# Patient Record
Sex: MALE | Race: WHITE
[De-identification: names, ages, dates, MRNs, and addresses within clinical notes are randomized per-mention and may not be internally consistent; named-entity substitution may affect disease eponyms.]

---

## 2017-04-20 ENCOUNTER — HOSPITAL ENCOUNTER (OUTPATIENT)
Dept: HOSPITAL 79 - KOH-I | Age: 57
End: 2017-04-20
Payer: COMMERCIAL

## 2017-04-20 DIAGNOSIS — R06.00: Primary | ICD-10-CM

## 2017-06-22 ENCOUNTER — HOSPITAL ENCOUNTER (OUTPATIENT)
Dept: HOSPITAL 79 - HEART 5 | Age: 57
End: 2017-06-22
Attending: INTERNAL MEDICINE
Payer: COMMERCIAL

## 2017-06-22 DIAGNOSIS — R06.00: Primary | ICD-10-CM

## 2020-06-05 ENCOUNTER — TRANSCRIBE ORDERS (OUTPATIENT)
Dept: ADMINISTRATIVE | Facility: HOSPITAL | Age: 60
End: 2020-06-05

## 2020-06-05 DIAGNOSIS — R94.39 ABNORMAL STRESS TEST: Primary | ICD-10-CM

## 2020-06-16 ENCOUNTER — HOSPITAL ENCOUNTER (INPATIENT)
Facility: HOSPITAL | Age: 60
LOS: 6 days | Discharge: HOME | End: 2020-06-23
Attending: INTERNAL MEDICINE | Admitting: THORACIC SURGERY (CARDIOTHORACIC VASCULAR SURGERY)

## 2020-06-16 ENCOUNTER — APPOINTMENT (OUTPATIENT)
Dept: GENERAL RADIOLOGY | Facility: HOSPITAL | Age: 60
End: 2020-06-16

## 2020-06-16 ENCOUNTER — APPOINTMENT (OUTPATIENT)
Dept: PULMONOLOGY | Facility: HOSPITAL | Age: 60
End: 2020-06-16

## 2020-06-16 DIAGNOSIS — I34.0 SEVERE MITRAL VALVE REGURGITATION: ICD-10-CM

## 2020-06-16 DIAGNOSIS — I34.0 MITRAL VALVE INSUFFICIENCY, UNSPECIFIED ETIOLOGY: Primary | ICD-10-CM

## 2020-06-16 DIAGNOSIS — R94.39 ABNORMAL STRESS TEST: ICD-10-CM

## 2020-06-16 LAB
ABO GROUP BLD: NORMAL
ABO GROUP BLD: NORMAL
AMPHET+METHAMPHET UR QL: NEGATIVE
AMPHETAMINES UR QL: POSITIVE
ANION GAP SERPL CALCULATED.3IONS-SCNC: 11 MMOL/L (ref 5–15)
APTT PPP: 37.7 SECONDS (ref 24–37)
BARBITURATES UR QL SCN: NEGATIVE
BENZODIAZ UR QL SCN: POSITIVE
BLD GP AB SCN SERPL QL: NEGATIVE
BUN BLD-MCNC: 18 MG/DL (ref 8–23)
BUN/CREAT SERPL: 18.6 (ref 7–25)
BUPRENORPHINE SERPL-MCNC: NEGATIVE NG/ML
CALCIUM SPEC-SCNC: 9.2 MG/DL (ref 8.6–10.5)
CANNABINOIDS SERPL QL: NEGATIVE
CHLORIDE SERPL-SCNC: 103 MMOL/L (ref 98–107)
CHOLEST SERPL-MCNC: 104 MG/DL (ref 0–200)
CO2 SERPL-SCNC: 25 MMOL/L (ref 22–29)
COCAINE UR QL: NEGATIVE
CREAT BLD-MCNC: 0.97 MG/DL (ref 0.76–1.27)
DEPRECATED RDW RBC AUTO: 46 FL (ref 37–54)
ERYTHROCYTE [DISTWIDTH] IN BLOOD BY AUTOMATED COUNT: 14.9 % (ref 12.3–15.4)
GFR SERPL CREATININE-BSD FRML MDRD: 79 ML/MIN/1.73
GLUCOSE BLD-MCNC: 97 MG/DL (ref 65–99)
HBA1C MFR BLD: 4.9 % (ref 4.8–5.6)
HCT VFR BLD AUTO: 42.1 % (ref 37.5–51)
HDLC SERPL-MCNC: 44 MG/DL (ref 40–60)
HGB BLD-MCNC: 12.9 G/DL (ref 13–17.7)
INR PPP: 1.13 (ref 0.85–1.16)
LDLC SERPL CALC-MCNC: 30 MG/DL (ref 0–100)
LDLC/HDLC SERPL: 0.68 {RATIO}
MCH RBC QN AUTO: 26 PG (ref 26.6–33)
MCHC RBC AUTO-ENTMCNC: 30.6 G/DL (ref 31.5–35.7)
MCV RBC AUTO: 84.7 FL (ref 79–97)
METHADONE UR QL SCN: NEGATIVE
OPIATES UR QL: NEGATIVE
OXYCODONE UR QL SCN: NEGATIVE
PA ADP PRP-ACNC: 303 PRU
PCP UR QL SCN: NEGATIVE
PLATELET # BLD AUTO: 196 10*3/MM3 (ref 140–450)
PMV BLD AUTO: 12.4 FL (ref 6–12)
POTASSIUM BLD-SCNC: 3.7 MMOL/L (ref 3.5–5.2)
PROPOXYPH UR QL: NEGATIVE
PROTHROMBIN TIME: 14.2 SECONDS (ref 11.5–14)
RBC # BLD AUTO: 4.97 10*6/MM3 (ref 4.14–5.8)
RH BLD: POSITIVE
RH BLD: POSITIVE
SODIUM BLD-SCNC: 139 MMOL/L (ref 136–145)
T&S EXPIRATION DATE: NORMAL
TRICYCLICS UR QL SCN: NEGATIVE
TRIGL SERPL-MCNC: 150 MG/DL (ref 0–150)
VLDLC SERPL-MCNC: 30 MG/DL
WBC NRBC COR # BLD: 6.3 10*3/MM3 (ref 3.4–10.8)

## 2020-06-16 PROCEDURE — 86923 COMPATIBILITY TEST ELECTRIC: CPT

## 2020-06-16 PROCEDURE — 36415 COLL VENOUS BLD VENIPUNCTURE: CPT

## 2020-06-16 PROCEDURE — 86850 RBC ANTIBODY SCREEN: CPT | Performed by: PHYSICIAN ASSISTANT

## 2020-06-16 PROCEDURE — 86900 BLOOD TYPING SEROLOGIC ABO: CPT

## 2020-06-16 PROCEDURE — B2151ZZ FLUOROSCOPY OF LEFT HEART USING LOW OSMOLAR CONTRAST: ICD-10-PCS | Performed by: INTERNAL MEDICINE

## 2020-06-16 PROCEDURE — 80306 DRUG TEST PRSMV INSTRMNT: CPT | Performed by: PHYSICIAN ASSISTANT

## 2020-06-16 PROCEDURE — 25010000002 MIDAZOLAM PER 1 MG: Performed by: INTERNAL MEDICINE

## 2020-06-16 PROCEDURE — 93005 ELECTROCARDIOGRAM TRACING: CPT | Performed by: PHYSICIAN ASSISTANT

## 2020-06-16 PROCEDURE — 94010 BREATHING CAPACITY TEST: CPT | Performed by: INTERNAL MEDICINE

## 2020-06-16 PROCEDURE — 83036 HEMOGLOBIN GLYCOSYLATED A1C: CPT | Performed by: PHYSICIAN ASSISTANT

## 2020-06-16 PROCEDURE — 25010000002 FENTANYL CITRATE (PF) 100 MCG/2ML SOLUTION: Performed by: INTERNAL MEDICINE

## 2020-06-16 PROCEDURE — 86901 BLOOD TYPING SEROLOGIC RH(D): CPT

## 2020-06-16 PROCEDURE — 86900 BLOOD TYPING SEROLOGIC ABO: CPT | Performed by: PHYSICIAN ASSISTANT

## 2020-06-16 PROCEDURE — 82565 ASSAY OF CREATININE: CPT

## 2020-06-16 PROCEDURE — 93010 ELECTROCARDIOGRAM REPORT: CPT | Performed by: INTERNAL MEDICINE

## 2020-06-16 PROCEDURE — 99255 IP/OBS CONSLTJ NEW/EST HI 80: CPT | Performed by: THORACIC SURGERY (CARDIOTHORACIC VASCULAR SURGERY)

## 2020-06-16 PROCEDURE — 85730 THROMBOPLASTIN TIME PARTIAL: CPT | Performed by: PHYSICIAN ASSISTANT

## 2020-06-16 PROCEDURE — 80061 LIPID PANEL: CPT | Performed by: PHYSICIAN ASSISTANT

## 2020-06-16 PROCEDURE — 0 IOPAMIDOL PER 1 ML: Performed by: INTERNAL MEDICINE

## 2020-06-16 PROCEDURE — C1894 INTRO/SHEATH, NON-LASER: HCPCS | Performed by: INTERNAL MEDICINE

## 2020-06-16 PROCEDURE — 4A023N7 MEASUREMENT OF CARDIAC SAMPLING AND PRESSURE, LEFT HEART, PERCUTANEOUS APPROACH: ICD-10-PCS | Performed by: INTERNAL MEDICINE

## 2020-06-16 PROCEDURE — 71045 X-RAY EXAM CHEST 1 VIEW: CPT

## 2020-06-16 PROCEDURE — 80048 BASIC METABOLIC PNL TOTAL CA: CPT

## 2020-06-16 PROCEDURE — 85027 COMPLETE CBC AUTOMATED: CPT

## 2020-06-16 PROCEDURE — 93458 L HRT ARTERY/VENTRICLE ANGIO: CPT | Performed by: INTERNAL MEDICINE

## 2020-06-16 PROCEDURE — 86901 BLOOD TYPING SEROLOGIC RH(D): CPT | Performed by: PHYSICIAN ASSISTANT

## 2020-06-16 PROCEDURE — C1769 GUIDE WIRE: HCPCS | Performed by: INTERNAL MEDICINE

## 2020-06-16 PROCEDURE — 94010 BREATHING CAPACITY TEST: CPT

## 2020-06-16 PROCEDURE — 85576 BLOOD PLATELET AGGREGATION: CPT | Performed by: PHYSICIAN ASSISTANT

## 2020-06-16 PROCEDURE — 85610 PROTHROMBIN TIME: CPT | Performed by: PHYSICIAN ASSISTANT

## 2020-06-16 PROCEDURE — 25010000002 HEPARIN (PORCINE) PER 1000 UNITS: Performed by: INTERNAL MEDICINE

## 2020-06-16 PROCEDURE — B2111ZZ FLUOROSCOPY OF MULTIPLE CORONARY ARTERIES USING LOW OSMOLAR CONTRAST: ICD-10-PCS | Performed by: INTERNAL MEDICINE

## 2020-06-16 RX ORDER — HYDROCODONE BITARTRATE AND ACETAMINOPHEN 5; 325 MG/1; MG/1
1 TABLET ORAL EVERY 4 HOURS PRN
Status: DISCONTINUED | OUTPATIENT
Start: 2020-06-16 | End: 2020-06-18

## 2020-06-16 RX ORDER — SERTRALINE HYDROCHLORIDE 100 MG/1
100 TABLET, FILM COATED ORAL DAILY
Status: DISCONTINUED | OUTPATIENT
Start: 2020-06-16 | End: 2020-06-23 | Stop reason: HOSPADM

## 2020-06-16 RX ORDER — POTASSIUM CHLORIDE 20 MEQ/1
20 TABLET, EXTENDED RELEASE ORAL 2 TIMES DAILY
COMMUNITY

## 2020-06-16 RX ORDER — CHLORHEXIDINE GLUCONATE 500 MG/1
1 CLOTH TOPICAL EVERY 12 HOURS PRN
Status: DISCONTINUED | OUTPATIENT
Start: 2020-06-17 | End: 2020-06-18

## 2020-06-16 RX ORDER — MIDAZOLAM HYDROCHLORIDE 1 MG/ML
INJECTION INTRAMUSCULAR; INTRAVENOUS AS NEEDED
Status: DISCONTINUED | OUTPATIENT
Start: 2020-06-16 | End: 2020-06-16 | Stop reason: HOSPADM

## 2020-06-16 RX ORDER — ISOSORBIDE MONONITRATE 30 MG/1
30 TABLET, EXTENDED RELEASE ORAL DAILY
Status: DISCONTINUED | OUTPATIENT
Start: 2020-06-16 | End: 2020-06-18

## 2020-06-16 RX ORDER — METOPROLOL SUCCINATE 100 MG/1
100 TABLET, EXTENDED RELEASE ORAL DAILY
COMMUNITY

## 2020-06-16 RX ORDER — FUROSEMIDE 20 MG/1
20 TABLET ORAL DAILY
COMMUNITY

## 2020-06-16 RX ORDER — NALOXONE HCL 0.4 MG/ML
0.4 VIAL (ML) INJECTION
Status: DISCONTINUED | OUTPATIENT
Start: 2020-06-16 | End: 2020-06-18

## 2020-06-16 RX ORDER — ACETAMINOPHEN 325 MG/1
650 TABLET ORAL EVERY 4 HOURS PRN
Status: DISCONTINUED | OUTPATIENT
Start: 2020-06-16 | End: 2020-06-23 | Stop reason: HOSPADM

## 2020-06-16 RX ORDER — ATORVASTATIN CALCIUM 40 MG/1
40 TABLET, FILM COATED ORAL DAILY
Status: DISCONTINUED | OUTPATIENT
Start: 2020-06-16 | End: 2020-06-18

## 2020-06-16 RX ORDER — SERTRALINE HYDROCHLORIDE 100 MG/1
100 TABLET, FILM COATED ORAL DAILY
COMMUNITY

## 2020-06-16 RX ORDER — SODIUM CHLORIDE 0.9 % (FLUSH) 0.9 %
10 SYRINGE (ML) INJECTION EVERY 12 HOURS SCHEDULED
Status: DISCONTINUED | OUTPATIENT
Start: 2020-06-16 | End: 2020-06-23 | Stop reason: HOSPADM

## 2020-06-16 RX ORDER — FENTANYL CITRATE 50 UG/ML
INJECTION, SOLUTION INTRAMUSCULAR; INTRAVENOUS AS NEEDED
Status: DISCONTINUED | OUTPATIENT
Start: 2020-06-16 | End: 2020-06-16 | Stop reason: HOSPADM

## 2020-06-16 RX ORDER — AMLODIPINE BESYLATE 5 MG/1
5 TABLET ORAL DAILY
Status: DISCONTINUED | OUTPATIENT
Start: 2020-06-16 | End: 2020-06-19

## 2020-06-16 RX ORDER — ATORVASTATIN CALCIUM 40 MG/1
40 TABLET, FILM COATED ORAL DAILY
COMMUNITY

## 2020-06-16 RX ORDER — CHLORHEXIDINE GLUCONATE 0.12 MG/ML
15 RINSE ORAL EVERY 12 HOURS
Status: COMPLETED | OUTPATIENT
Start: 2020-06-17 | End: 2020-06-18

## 2020-06-16 RX ORDER — SODIUM CHLORIDE 0.9 % (FLUSH) 0.9 %
10 SYRINGE (ML) INJECTION AS NEEDED
Status: DISCONTINUED | OUTPATIENT
Start: 2020-06-16 | End: 2020-06-23 | Stop reason: HOSPADM

## 2020-06-16 RX ORDER — POTASSIUM CHLORIDE 750 MG/1
20 CAPSULE, EXTENDED RELEASE ORAL 2 TIMES DAILY WITH MEALS
Status: DISCONTINUED | OUTPATIENT
Start: 2020-06-16 | End: 2020-06-18

## 2020-06-16 RX ORDER — METOPROLOL SUCCINATE 100 MG/1
100 TABLET, EXTENDED RELEASE ORAL DAILY
Status: DISCONTINUED | OUTPATIENT
Start: 2020-06-17 | End: 2020-06-18

## 2020-06-16 RX ORDER — TEMAZEPAM 7.5 MG/1
7.5 CAPSULE ORAL NIGHTLY PRN
Status: DISCONTINUED | OUTPATIENT
Start: 2020-06-16 | End: 2020-06-19

## 2020-06-16 RX ORDER — LIDOCAINE HYDROCHLORIDE 10 MG/ML
INJECTION, SOLUTION EPIDURAL; INFILTRATION; INTRACAUDAL; PERINEURAL AS NEEDED
Status: DISCONTINUED | OUTPATIENT
Start: 2020-06-16 | End: 2020-06-16 | Stop reason: HOSPADM

## 2020-06-16 RX ORDER — ISOSORBIDE MONONITRATE 30 MG/1
30 TABLET, EXTENDED RELEASE ORAL DAILY
COMMUNITY

## 2020-06-16 RX ORDER — TAMSULOSIN HYDROCHLORIDE 0.4 MG/1
1 CAPSULE ORAL DAILY
COMMUNITY

## 2020-06-16 RX ORDER — FUROSEMIDE 20 MG/1
20 TABLET ORAL DAILY
Status: DISCONTINUED | OUTPATIENT
Start: 2020-06-16 | End: 2020-06-18

## 2020-06-16 RX ORDER — SODIUM CHLORIDE 9 MG/ML
250 INJECTION, SOLUTION INTRAVENOUS CONTINUOUS
Status: ACTIVE | OUTPATIENT
Start: 2020-06-16 | End: 2020-06-16

## 2020-06-16 RX ORDER — ALPRAZOLAM 0.25 MG/1
0.25 TABLET ORAL 3 TIMES DAILY PRN
Status: DISCONTINUED | OUTPATIENT
Start: 2020-06-16 | End: 2020-06-23 | Stop reason: HOSPADM

## 2020-06-16 RX ORDER — ASPIRIN 325 MG
325 TABLET, DELAYED RELEASE (ENTERIC COATED) ORAL DAILY
Status: DISCONTINUED | OUTPATIENT
Start: 2020-06-16 | End: 2020-06-23 | Stop reason: HOSPADM

## 2020-06-16 RX ORDER — TAMSULOSIN HYDROCHLORIDE 0.4 MG/1
0.4 CAPSULE ORAL DAILY
Status: DISCONTINUED | OUTPATIENT
Start: 2020-06-16 | End: 2020-06-23 | Stop reason: HOSPADM

## 2020-06-16 RX ORDER — MORPHINE SULFATE 2 MG/ML
1 INJECTION, SOLUTION INTRAMUSCULAR; INTRAVENOUS EVERY 4 HOURS PRN
Status: DISCONTINUED | OUTPATIENT
Start: 2020-06-16 | End: 2020-06-18

## 2020-06-16 RX ORDER — AMLODIPINE BESYLATE 5 MG/1
5 TABLET ORAL DAILY
COMMUNITY

## 2020-06-16 RX ORDER — IPRATROPIUM BROMIDE AND ALBUTEROL SULFATE 2.5; .5 MG/3ML; MG/3ML
3 SOLUTION RESPIRATORY (INHALATION) EVERY 4 HOURS PRN
Status: DISCONTINUED | OUTPATIENT
Start: 2020-06-17 | End: 2020-06-23 | Stop reason: HOSPADM

## 2020-06-16 RX ADMIN — SODIUM CHLORIDE, PRESERVATIVE FREE 10 ML: 5 INJECTION INTRAVENOUS at 20:47

## 2020-06-16 RX ADMIN — ASPIRIN 325 MG: 325 TABLET, COATED ORAL at 11:44

## 2020-06-16 RX ADMIN — AMLODIPINE BESYLATE 5 MG: 5 TABLET ORAL at 20:06

## 2020-06-16 RX ADMIN — ISOSORBIDE MONONITRATE 30 MG: 60 TABLET, EXTENDED RELEASE ORAL at 20:06

## 2020-06-16 RX ADMIN — TAMSULOSIN HYDROCHLORIDE 0.4 MG: 0.4 CAPSULE ORAL at 20:47

## 2020-06-16 RX ADMIN — ATORVASTATIN CALCIUM 40 MG: 40 TABLET, FILM COATED ORAL at 20:06

## 2020-06-16 RX ADMIN — SERTRALINE HYDROCHLORIDE 100 MG: 100 TABLET ORAL at 20:47

## 2020-06-16 NOTE — CONSULTS
"      CTS consult   Patient Care Team:  Provider, No Known as PCP - General  Referring MD: Tyler Goldstein MD    Chief Complaint: Severe mitral regurgitation    HPI  Patient is a 59 y.o. male with known hypertension hyperlipidemia and coronary artery disease.  Patient states his symptoms began approximately 2 years ago.  He describes having chest pressure.  Eventually found his way to cardiologist and had a work-up and was found to have significant coronary disease.  He had a stent placed in 2017.  He went through short and ultimately long-term disability.  He states initially after his stents he improved but never did fully recover.  His symptoms are so bad that he never returned to work.  He eventually retired.  He still gets continued chest pressure occasionally however his main problem is with severe shortness of breath with very minimal activity.  It is severe enough that he is unable to talk.  He states he can do very minimal activity.  He recently was seen and evaluated by Dr. Goldstein in his office.  At that appointment he was noted to have a murmur.  Reportedly he had a carotid duplex and echocardiogram performed in the office, however no data is available at this time.  The work-up led to him having a re-catheterization today.  Results showed normal coronaries with a patent stent in the LAD.  Results also showed 4+ mitral regurgitation.  Currently the patient is pain-free with no acute distress.  Shortness of breath is controlled at rest.  Plan is for the patient to be admitted and undergo full evaluation for possible mitral valve repair versus replacement.  Patient also reports that he was told from his chart review that he had COPD.  However the patient states that he was never told this.  He states he was referred to a pulmonologist at one point for shortness of breath evaluation and told his lungs were \"fine\".    Review of Systems  Pertinent items are noted in HPI.      History  Past Medical History: "   Diagnosis Date   • Anxiety    • Depression    • HLD (hyperlipidemia)    • Hypertension      Past Surgical History:   Procedure Laterality Date   • ANKLE SURGERY     • ARM AMPUTATION     • CORONARY ANGIOPLASTY WITH STENT PLACEMENT     • ROTATOR CUFF REPAIR       History reviewed. No pertinent family history.  Social History     Tobacco Use   • Smoking status: Never Smoker   • Smokeless tobacco: Current User     Types: Chew   Substance Use Topics   • Alcohol use: Never     Frequency: Never   • Drug use: Never     Medications Prior to Admission   Medication Sig Dispense Refill Last Dose   • amLODIPine (NORVASC) 5 MG tablet Take 5 mg by mouth Daily.   6/15/2020 at Unknown time   • atorvastatin (LIPITOR) 40 MG tablet Take 40 mg by mouth Daily.   6/15/2020 at Unknown time   • furosemide (LASIX) 20 MG tablet Take 20 mg by mouth Daily.   6/15/2020 at Unknown time   • isosorbide mononitrate (IMDUR) 30 MG 24 hr tablet Take 30 mg by mouth Daily.   6/15/2020 at Unknown time   • metoprolol succinate XL (TOPROL-XL) 100 MG 24 hr tablet Take 100 mg by mouth Daily.   6/16/2020 at Unknown time   • potassium chloride (K-DUR,KLOR-CON) 20 MEQ CR tablet Take 20 mEq by mouth 2 (Two) Times a Day.   6/15/2020 at Unknown time   • sertraline (ZOLOFT) 100 MG tablet Take 100 mg by mouth Daily.   6/15/2020 at Unknown time   • tamsulosin (FLOMAX) 0.4 MG capsule 24 hr capsule Take 1 capsule by mouth Daily.   6/15/2020 at Unknown time     Allergies:  Patient has no known allergies.    Objective    Vital Signs  Temp:  [97.3 °F (36.3 °C)] 97.3 °F (36.3 °C)  Heart Rate:  [53-65] 63  Resp:  [16-18] 18  BP: (101-128)/(58-89) 126/74    Physical Exam:  General Appearance: Well-developed well-nourished, cooperative and pleasant  Head: Atraumatic normocephalic  Neck: Supple, no bruit or masses noted  Lungs: Clear to auscultation bilaterally no wheezes rales or rubs  Heart: Regular rate and rhythm positive loud blowing systolic murmur heard best at the  apex radiates to the axilla  Abdomen: Soft, positive bowel sounds, nontender, no mass or bruits noted  Extremities: No clubbing cyanosis or edema  Pulses: Palpable peripheral pulses throughout the upper and lower extremities  Skin: Warm, dry no discolorations or rashes noted.  No nonhealing ulcers noted  Neurologic: Grossly intact no focal deficit.  Awake alert and oriented          Data Review:  Results from last 7 days   Lab Units 06/16/20  1136   WBC 10*3/mm3 6.30   HEMOGLOBIN g/dL 12.9*   HEMATOCRIT % 42.1   PLATELETS 10*3/mm3 196     Results from last 7 days   Lab Units 06/16/20  1136   SODIUM mmol/L 139   POTASSIUM mmol/L 3.7   CHLORIDE mmol/L 103   CO2 mmol/L 25.0   BUN mg/dL 18   CREATININE mg/dL 0.97   GLUCOSE mg/dL 97   CALCIUM mg/dL 9.2           Cardiac catheterization: Angiographic Findings:  Coronary dominance, right  · LM:   Normal  · LAD: Patent stent  · LCX: Luminal irregularities  · RCA: Luminal irregularities     LV: LVEF 50% with 4+ mitral valve regurgitation      Imaging Results (Last 72 Hours)     ** No results found for the last 72 hours. **            Assessment:    Severe mitral regurgitation  Coronary disease with a history of stent in the LAD.  Catheterization today shows normal coronaries with a patent LAD stent  Hypertension  Hyperlipidemia    Abnormal stress test    Mitral valve insufficiency        Plan:  Preoperative work-up underway.  Per the patient he has had an echocardiogram and a carotid duplex performed in Dr. Goldstein's office.  We will get the results of that if we are unable to we will may need to repeat this test.  We will check a P2 Y 12.  Patient denies any blood thinning agents.  Tentatively scheduled to undergo mitral valve repair versus replacement on 6/18/2020.  CT surgery will continue to follow making final arrangements    I agree fully with the above.  I saw the patient on June 16 and discussed surgery with him in detail.  He is aware that surgery has with the risk  of stroke bleeding infection death and renal failure.  He is well-informed about the valve choices and options and he would like to proceed with a bioprosthetic valve.  Of also discussed that shortness with Dr. Goldstein who is in agreement.  Plan for surgery on June 18.      NIKOLAY Pagan  06/16/20  17:09

## 2020-06-16 NOTE — H&P
Pre-Cardiac Catheterization Report  Cardiovascular Laboratory  [facility]      Patient:  Rakan Boone  :  1960  PCP:  Provider, No Known  PHONE:  None    DATE: 2020    BRIEF HPI:  Rakan Boone is a 59 y.o. male with hypertension, hypercholesterolemia, family history of coronary artery disease, and known coronary artery disease.  He is post previous stent from 2017.  He has been complaining of increasing chest pain that is been occurring for the past month.  He describes it as an ache that is moderate in severity lasting minutes with associated shortness of breath, dyspnea on exertion, fatigue, and palpitations.  His symptoms increased with stress and are decreased with rest.  He recently underwent an abnormal stress test and now presents for left heart catheterization with possible intervention.    Cardiac Risk Factors:  advanced age (older than 55 for men, 65 for women), dyslipidemia, family history of premature cardiovascular disease, hypertension, male gender    Anginal class in last 2 weeks:  CCS class III    CHF Class in last 2 weeks:  NYHA Class II    Cardiogenic shock:  no    Cardiac arrest <24 hours:  no    Stress test within last 6 months:   yes   Details:    Previous cardiac catheterization:  yes  Details:     Previous CABG:  no  Details:      Allergies:     IV contrast allergy:  no  No Known Allergies    MEDICATIONS:  Prior to Admission medications    Medication Sig Start Date End Date Taking? Authorizing Provider   amLODIPine (NORVASC) 5 MG tablet Take 5 mg by mouth Daily.   Yes Villa Krause MD   atorvastatin (LIPITOR) 40 MG tablet Take 40 mg by mouth Daily.   Yes Villa Krause MD   isosorbide mononitrate (IMDUR) 30 MG 24 hr tablet Take 30 mg by mouth Daily.   Yes Villa Krause MD   metoprolol succinate XL (TOPROL-XL) 100 MG 24 hr tablet Take 100 mg by mouth Daily.   Yes Villa Krause MD   sertraline (ZOLOFT) 100 MG tablet Take 100 mg by  mouth Daily.   Yes Provider, MD Villa   tamsulosin (FLOMAX) 0.4 MG capsule 24 hr capsule Take 1 capsule by mouth Daily.   Yes Provider, MD Villa       Past medical & surgical history, social and family history reviewed in the electronic medical record.    ROS:  Cardiovascular ROS: positive for - chest pain, dyspnea on exertion, palpitations and shortness of breath    Physical Exam:    Vitals: There were no vitals filed for this visit. There were no vitals filed for this visit.    General Appearance:    Alert, cooperative, in no acute distress   Head:    Normocephalic, without obvious abnormality, atraumatic   Eyes:            Lids and lashes normal, conjunctivae and sclerae normal, no   icterus, no pallor, corneas clear, PERRLA   Ears:    Ears appear intact with no abnormalities noted   Neck:   No adenopathy, supple, trachea midline, no thyromegaly, +   carotid bruits, no JVD   Back:     No kyphosis present, no scoliosis present, range of motion normal   Lungs:     Clear to auscultation,respirations regular, even and                unlabored    Heart:    Regular rhythm and normal rate, normal S1 and S2, 2/6 SE         murmur, no gallop, no rub, no click   Chest Wall:    No abnormalities observed   Abdomen:     Normal bowel sounds, no masses, no organomegaly, soft     nontender, nondistended, no guarding, no rebound                tenderness   Rectal:     Deferred   Extremities:   Moves all extremities well, no edema, no cyanosis, no           redness   Pulses:   Pulses palpable and equal bilaterally   Skin:   No bleeding, bruising or rash   Neurologic:   Cranial nerves 2 - 12 grossly intact, sensation intact     Barbaeu Test:  Left: Normal  (oxymetric Allens) Right: Not Assessed             No results found for: CHLPL, TRIG, HDL, LDLDIRECT, AST, ALT        Impression      · Abnormal stress test    Plan     · Procedure to perform: [facility]  · Planned access: Left radial artery              Miquel Lerner,  PA  06/16/20  11:21

## 2020-06-17 ENCOUNTER — ANESTHESIA EVENT (OUTPATIENT)
Dept: PERIOP | Facility: HOSPITAL | Age: 60
End: 2020-06-17

## 2020-06-17 LAB
ALBUMIN SERPL-MCNC: 3.8 G/DL (ref 3.5–5.2)
ALBUMIN/GLOB SERPL: 1.8 G/DL
ALP SERPL-CCNC: 72 U/L (ref 39–117)
ALT SERPL W P-5'-P-CCNC: 7 U/L (ref 1–41)
ANION GAP SERPL CALCULATED.3IONS-SCNC: 10 MMOL/L (ref 5–15)
AST SERPL-CCNC: 11 U/L (ref 1–40)
BILIRUB SERPL-MCNC: 1.3 MG/DL (ref 0.2–1.2)
BUN BLD-MCNC: 21 MG/DL (ref 8–23)
BUN/CREAT SERPL: 25.3 (ref 7–25)
CALCIUM SPEC-SCNC: 8.6 MG/DL (ref 8.6–10.5)
CHLORIDE SERPL-SCNC: 105 MMOL/L (ref 98–107)
CO2 SERPL-SCNC: 24 MMOL/L (ref 22–29)
CREAT BLD-MCNC: 0.83 MG/DL (ref 0.76–1.27)
DEPRECATED RDW RBC AUTO: 45.9 FL (ref 37–54)
ERYTHROCYTE [DISTWIDTH] IN BLOOD BY AUTOMATED COUNT: 14.8 % (ref 12.3–15.4)
GFR SERPL CREATININE-BSD FRML MDRD: 95 ML/MIN/1.73
GLOBULIN UR ELPH-MCNC: 2.1 GM/DL
GLUCOSE BLD-MCNC: 101 MG/DL (ref 65–99)
GLUCOSE BLDC GLUCOMTR-MCNC: 105 MG/DL (ref 70–130)
GLUCOSE BLDC GLUCOMTR-MCNC: 107 MG/DL (ref 70–130)
GLUCOSE BLDC GLUCOMTR-MCNC: 127 MG/DL (ref 70–130)
HCT VFR BLD AUTO: 37.7 % (ref 37.5–51)
HGB BLD-MCNC: 11.3 G/DL (ref 13–17.7)
MCH RBC QN AUTO: 25.7 PG (ref 26.6–33)
MCHC RBC AUTO-ENTMCNC: 30 G/DL (ref 31.5–35.7)
MCV RBC AUTO: 85.7 FL (ref 79–97)
PA ADP PRP-ACNC: 335 PRU
PLATELET # BLD AUTO: 157 10*3/MM3 (ref 140–450)
PMV BLD AUTO: 12.3 FL (ref 6–12)
POTASSIUM BLD-SCNC: 3.9 MMOL/L (ref 3.5–5.2)
PROT SERPL-MCNC: 5.9 G/DL (ref 6–8.5)
RBC # BLD AUTO: 4.4 10*6/MM3 (ref 4.14–5.8)
SODIUM BLD-SCNC: 139 MMOL/L (ref 136–145)
WBC NRBC COR # BLD: 5.36 10*3/MM3 (ref 3.4–10.8)

## 2020-06-17 PROCEDURE — 85027 COMPLETE CBC AUTOMATED: CPT | Performed by: PHYSICIAN ASSISTANT

## 2020-06-17 PROCEDURE — 85576 BLOOD PLATELET AGGREGATION: CPT | Performed by: PHYSICIAN ASSISTANT

## 2020-06-17 PROCEDURE — 82962 GLUCOSE BLOOD TEST: CPT

## 2020-06-17 PROCEDURE — 80053 COMPREHEN METABOLIC PANEL: CPT | Performed by: PHYSICIAN ASSISTANT

## 2020-06-17 PROCEDURE — 87635 SARS-COV-2 COVID-19 AMP PRB: CPT | Performed by: PHYSICIAN ASSISTANT

## 2020-06-17 PROCEDURE — 99024 POSTOP FOLLOW-UP VISIT: CPT | Performed by: THORACIC SURGERY (CARDIOTHORACIC VASCULAR SURGERY)

## 2020-06-17 RX ORDER — SODIUM CHLORIDE 0.9 % (FLUSH) 0.9 %
10 SYRINGE (ML) INJECTION AS NEEDED
Status: CANCELLED | OUTPATIENT
Start: 2020-06-17

## 2020-06-17 RX ORDER — SODIUM CHLORIDE 0.9 % (FLUSH) 0.9 %
10 SYRINGE (ML) INJECTION EVERY 12 HOURS SCHEDULED
Status: CANCELLED | OUTPATIENT
Start: 2020-06-17

## 2020-06-17 RX ORDER — FAMOTIDINE 10 MG/ML
20 INJECTION, SOLUTION INTRAVENOUS ONCE
Status: CANCELLED | OUTPATIENT
Start: 2020-06-17 | End: 2020-06-17

## 2020-06-17 RX ADMIN — TAMSULOSIN HYDROCHLORIDE 0.4 MG: 0.4 CAPSULE ORAL at 08:55

## 2020-06-17 RX ADMIN — ATORVASTATIN CALCIUM 40 MG: 40 TABLET, FILM COATED ORAL at 08:55

## 2020-06-17 RX ADMIN — SODIUM CHLORIDE, PRESERVATIVE FREE 10 ML: 5 INJECTION INTRAVENOUS at 08:53

## 2020-06-17 RX ADMIN — POTASSIUM CHLORIDE 20 MEQ: 10 CAPSULE, COATED, EXTENDED RELEASE ORAL at 19:04

## 2020-06-17 RX ADMIN — POTASSIUM CHLORIDE 20 MEQ: 10 CAPSULE, COATED, EXTENDED RELEASE ORAL at 08:54

## 2020-06-17 RX ADMIN — METOPROLOL SUCCINATE 100 MG: 100 TABLET, EXTENDED RELEASE ORAL at 08:54

## 2020-06-17 RX ADMIN — MUPIROCIN 1 APPLICATION: 20 OINTMENT TOPICAL at 19:04

## 2020-06-17 RX ADMIN — SERTRALINE HYDROCHLORIDE 100 MG: 100 TABLET ORAL at 08:54

## 2020-06-17 RX ADMIN — FUROSEMIDE 20 MG: 20 TABLET ORAL at 08:54

## 2020-06-17 RX ADMIN — ISOSORBIDE MONONITRATE 30 MG: 60 TABLET, EXTENDED RELEASE ORAL at 08:54

## 2020-06-17 RX ADMIN — AMLODIPINE BESYLATE 5 MG: 5 TABLET ORAL at 08:55

## 2020-06-17 RX ADMIN — CHLORHEXIDINE GLUCONATE 0.12% ORAL RINSE 15 ML: 1.2 LIQUID ORAL at 19:04

## 2020-06-17 NOTE — PLAN OF CARE
Problem: Patient Care Overview  Goal: Plan of Care Review  Outcome: Ongoing (interventions implemented as appropriate)  Flowsheets (Taken 6/17/2020 0440)  Progress: no change  Outcome Summary: Patient up from CVOU. VSS. Normal sinus to sinus bradycardia on monitor. Room air. Left radial site remains clean, dry, intact, and soft. No complaints of pain noted. Will continue to monitor.

## 2020-06-17 NOTE — PROGRESS NOTES
[facility]       LOS: 0 days   Patient Care Team:  Provider, No Known as PCP - General        Subjective       Patient Denies:  Cp, sob, palpitations.      Vital Signs  Temp:  [97.3 °F (36.3 °C)-98.6 °F (37 °C)] 98.5 °F (36.9 °C)  Heart Rate:  [53-68] 60  Resp:  [16-18] 18  BP: (101-128)/(58-90) 112/72  No intake or output data in the 24 hours ending 06/17/20 0923  No intake/output data recorded.    Physical Exam:     General Appearance:    Alert, cooperative, in no acute distress       Neck:   No adenopathy, supple, trachea midline, no thyromegaly, no JVD   Lungs:     Clear to auscultation,respirations regular, even and                unlabored    Heart:    Regular rhythm and normal rate, normal S1 and S2, 2/6 HS         murmur, no gallop, no rub, no click   Chest Wall:    No abnormalities observed   Abdomen:     Normal bowel sounds, no masses, no organomegaly, soft     nontender, nondistended   Extremities:   Moves all extremities well, no edema, no cyanosis, no           redness   Pulses:   Pulses palpable and equal bilaterally     Results Review:     I reviewed the patient's new clinical results.      WBC WBC   Date/Time Value Ref Range Status   06/17/2020 0458 5.36 3.40 - 10.80 10*3/mm3 Final   06/16/2020 1136 6.30 3.40 - 10.80 10*3/mm3 Final            HGB Hemoglobin   Date/Time Value Ref Range Status   06/17/2020 0458 11.3 (L) 13.0 - 17.7 g/dL Final   06/16/2020 1136 12.9 (L) 13.0 - 17.7 g/dL Final           HCT Hematocrit   Date/Time Value Ref Range Status   06/17/2020 0458 37.7 37.5 - 51.0 % Final   06/16/2020 1136 42.1 37.5 - 51.0 % Final            Platlets Platelets   Date/Time Value Ref Range Status   06/17/2020 0458 157 140 - 450 10*3/mm3 Final   06/16/2020 1136 196 140 - 450 10*3/mm3 Final     Sodium  Sodium   Date/Time Value Ref Range Status   06/17/2020 0458 139 136 - 145 mmol/L Final   06/16/2020 1136 139 136 - 145 mmol/L Final      Potassium  Potassium   Date/Time Value Ref Range Status   06/17/2020 0458 3.9 3.5 - 5.2 mmol/L Final   06/16/2020 1136 3.7 3.5 - 5.2 mmol/L Final     Chloride  Chloride   Date/Time Value Ref Range Status   06/17/2020 0458 105 98 - 107 mmol/L Final   06/16/2020 1136 103 98 - 107 mmol/L Final     BicarbonateNo results found for: PLASMABICARB    BUN BUN   Date/Time Value Ref Range Status   06/17/2020 0458 21 8 - 23 mg/dL Final   06/16/2020 1136 18 8 - 23 mg/dL Final      Creatinine Creatinine   Date/Time Value Ref Range Status   06/17/2020 0458 0.83 0.76 - 1.27 mg/dL Final   06/16/2020 1136 0.97 0.76 - 1.27 mg/dL Final      Calcium Calcium   Date/Time Value Ref Range Status   06/17/2020 0458 8.6 8.6 - 10.5 mg/dL Final   06/16/2020 1136 9.2 8.6 - 10.5 mg/dL Final      Mag @RESULFAST(MG:3)@        PT/INR:       Lab Results   Component Value Date    PROTIME 14.2 (H) 06/16/2020    INR 1.13 06/16/2020      Troponin I:  No results found for: TROPONINI No results found for: CKTOTAL, CKMB, CKMBINDEX, POCRTROPI, TROPONINT      amLODIPine 5 mg Oral Daily   [MAR Hold] aspirin 325 mg Oral Daily   atorvastatin 40 mg Oral Daily   chlorhexidine 15 mL Mouth/Throat Q12H   furosemide 20 mg Oral Daily   isosorbide mononitrate 30 mg Oral Daily   metoprolol succinate  mg Oral Daily   mupirocin 1 application Each Nare Q12H   potassium chloride 20 mEq Oral BID With Meals   sertraline 100 mg Oral Daily   sodium chloride 10 mL Intravenous Q12H   tamsulosin 0.4 mg Oral Daily          Assessment/Plan     Patient Active Problem List   Diagnosis Code   • Abnormal stress test R94.39   • Mitral valve insufficiency I34.0     Patent LAD stent  Normal EF  MVR tomorrow    MD Miquel Cadet PA  06/17/20  09:23

## 2020-06-17 NOTE — PROGRESS NOTES
Discharge Planning Assessment  [facility]     Patient Name: Rakan Boone  MRN: [id number]  Today's Date: 6/17/2020    Admit Date: 6/16/2020    Discharge Needs Assessment     Row Name 06/17/20 1454       Living Environment    Lives With  spouse;other relative(s)    Current Living Arrangements  home/apartment/condo    Primary Care Provided by  self    Provides Primary Care For  no one    Family Caregiver if Needed  spouse    Able to Return to Prior Arrangements  yes       Transition Planning    Patient/Family Anticipates Transition to  home    Transportation Anticipated  family or friend will provide       Discharge Needs Assessment    Equipment Currently Used at Home  none        Discharge Plan     Row Name 06/17/20 1456       Plan    Plan  Home    Patient/Family in Agreement with Plan  yes    Plan Comments  Spoke w/ patient and daughter at bedside. Patient lives with his wife and mother in law in a two story in [facility]. He has a bed and bath on the first floor. PTA he was independent w/ ADLS and mobility. He denies any needs at this time. Plan is home. CM following.     Final Discharge Disposition Code  01 - home or self-care        Destination      Coordination has not been started for this encounter.      Durable Medical Equipment      Coordination has not been started for this encounter.      Dialysis/Infusion      Coordination has not been started for this encounter.      Home Medical Care      Coordination has not been started for this encounter.      Therapy      Coordination has not been started for this encounter.      Community Resources      Coordination has not been started for this encounter.        Expected Discharge Date and Time     Expected Discharge Date Expected Discharge Time    Jun 16, 2020         Demographic Summary     Row Name 06/17/20 1454       General Information    Arrived From  home    Reason for Consult  discharge planning        Functional Status     Row Name 06/17/20 1454        Functional Status    Usual Activity Tolerance  moderate    Current Activity Tolerance  moderate        Psychosocial    No documentation.       Abuse/Neglect    No documentation.       Legal    No documentation.       Substance Abuse    No documentation.       Patient Forms    No documentation.           Yen Pal RN

## 2020-06-17 NOTE — PROGRESS NOTES
CTS Progress Note      POD  s/p        LOS: 0 days   Patient Care Team:  Provider, No Known as PCP - General    Subjective  Appetite is fair  Breathing better   slept fairly well last night    CC:     Objective    Vital Signs  Temp:  [97.3 °F (36.3 °C)-98.6 °F (37 °C)] 98.5 °F (36.9 °C)  Heart Rate:  [53-68] 60  Resp:  [16-18] 18  BP: (101-128)/(58-90) 112/72    Physical Exam:   General Appearance:    Lungs: clear to auscultation, respirations regular, respirations even and respirations unlabored   Heart: Regular rate and rhythm, loud holosystolic murmur   Skin: Warm, dry   Abdomen: Soft, nontender, bowel sounds present throughout.  Results   Results from last 7 days   Lab Units 06/17/20  0458   WBC 10*3/mm3 5.36   HEMOGLOBIN g/dL 11.3*   HEMATOCRIT % 37.7   PLATELETS 10*3/mm3 157     Results from last 7 days   Lab Units 06/17/20  0458   SODIUM mmol/L 139   POTASSIUM mmol/L 3.9   CHLORIDE mmol/L 105   CO2 mmol/L 24.0   BUN mg/dL 21   CREATININE mg/dL 0.83   GLUCOSE mg/dL 101*   CALCIUM mg/dL 8.6           Imaging Results (Last 24 Hours)     Procedure Component Value Units Date/Time    XR Chest 1 View [[id number]] Resulted:  06/16/20 1806     Updated:  06/16/20 1820          Assessment      Abnormal stress test    Mitral valve insufficiency  Seeing patient for severe mitral regurgitation  Hemodynamically stable      Plan   For mitral valve replacement tomorrow, Dr. Burden.  Agree fully with the above.  Patient is agreeable to proceed with a bioprosthetic valve and the risks have once again been explained and this is been discussed with NIKOLAY Luong  06/17/20  10:04

## 2020-06-18 ENCOUNTER — APPOINTMENT (OUTPATIENT)
Dept: GENERAL RADIOLOGY | Facility: HOSPITAL | Age: 60
End: 2020-06-18

## 2020-06-18 ENCOUNTER — ANESTHESIA (OUTPATIENT)
Dept: PERIOP | Facility: HOSPITAL | Age: 60
End: 2020-06-18

## 2020-06-18 ENCOUNTER — ANCILLARY PROCEDURE (OUTPATIENT)
Dept: PERIOP | Facility: HOSPITAL | Age: 60
End: 2020-06-18

## 2020-06-18 PROBLEM — E78.5 DYSLIPIDEMIA: Status: ACTIVE | Noted: 2020-06-18

## 2020-06-18 PROBLEM — Z72.0 CHEWING TOBACCO USE: Status: ACTIVE | Noted: 2020-06-18

## 2020-06-18 PROBLEM — I10 HYPERTENSION: Status: ACTIVE | Noted: 2020-06-18

## 2020-06-18 PROBLEM — F41.9 ANXIETY AND DEPRESSION: Status: ACTIVE | Noted: 2020-06-18

## 2020-06-18 PROBLEM — I34.0 SEVERE MITRAL VALVE REGURGITATION: Status: ACTIVE | Noted: 2020-06-18

## 2020-06-18 PROBLEM — N40.0 BPH (BENIGN PROSTATIC HYPERPLASIA): Status: ACTIVE | Noted: 2020-06-18

## 2020-06-18 PROBLEM — F32.A ANXIETY AND DEPRESSION: Status: ACTIVE | Noted: 2020-06-18

## 2020-06-18 LAB
ACT BLD: 120 SECONDS (ref 82–152)
ACT BLD: 125 SECONDS (ref 82–152)
ACT BLD: 131 SECONDS (ref 82–152)
ACT BLD: 632 SECONDS (ref 82–152)
ACT BLD: 874 SECONDS (ref 82–152)
ACT BLD: 951 SECONDS (ref 82–152)
ALBUMIN SERPL-MCNC: 3.7 G/DL (ref 3.5–5.2)
ALBUMIN SERPL-MCNC: 4.4 G/DL (ref 3.5–5.2)
ANION GAP SERPL CALCULATED.3IONS-SCNC: 11 MMOL/L (ref 5–15)
ANION GAP SERPL CALCULATED.3IONS-SCNC: 14 MMOL/L (ref 5–15)
APTT PPP: 36.8 SECONDS (ref 24–37)
ARTERIAL PATENCY WRIST A: ABNORMAL
ATMOSPHERIC PRESS: ABNORMAL MM[HG]
BASE EXCESS BLDA CALC-SCNC: -1 MMOL/L (ref -5–5)
BASE EXCESS BLDA CALC-SCNC: 0 MMOL/L (ref -5–5)
BASE EXCESS BLDA CALC-SCNC: 0.3 MMOL/L (ref 0–2)
BASE EXCESS BLDA CALC-SCNC: 1.4 MMOL/L (ref 0–2)
BASE EXCESS BLDA CALC-SCNC: 2 MMOL/L (ref -5–5)
BASE EXCESS BLDA CALC-SCNC: 2.8 MMOL/L (ref 0–2)
BASE EXCESS BLDA CALC-SCNC: 3 MMOL/L (ref -5–5)
BASE EXCESS BLDA CALC-SCNC: 4 MMOL/L (ref -5–5)
BASE EXCESS BLDA CALC-SCNC: 6 MMOL/L (ref -5–5)
BDY SITE: ABNORMAL
BODY TEMPERATURE: 37 C
BUN BLD-MCNC: 14 MG/DL (ref 8–23)
BUN BLD-MCNC: 16 MG/DL (ref 8–23)
BUN/CREAT SERPL: 13.6 (ref 7–25)
BUN/CREAT SERPL: 14.7 (ref 7–25)
CA-I BLDA-SCNC: 0.92 MMOL/L (ref 1.2–1.32)
CA-I BLDA-SCNC: 0.96 MMOL/L (ref 1.2–1.32)
CA-I BLDA-SCNC: 1.02 MMOL/L (ref 1.2–1.32)
CA-I BLDA-SCNC: 1.14 MMOL/L (ref 1.2–1.32)
CA-I BLDA-SCNC: 1.16 MMOL/L (ref 1.2–1.32)
CA-I BLDA-SCNC: 1.27 MMOL/L (ref 1.2–1.32)
CA-I SERPL ISE-MCNC: 1.38 MMOL/L (ref 1.12–1.32)
CALCIUM SPEC-SCNC: 9.3 MG/DL (ref 8.6–10.5)
CALCIUM SPEC-SCNC: 9.7 MG/DL (ref 8.6–10.5)
CHLORIDE SERPL-SCNC: 101 MMOL/L (ref 98–107)
CHLORIDE SERPL-SCNC: 102 MMOL/L (ref 98–107)
CO2 BLDA-SCNC: 24 MMOL/L (ref 24–29)
CO2 BLDA-SCNC: 25 MMOL/L (ref 24–29)
CO2 BLDA-SCNC: 27 MMOL/L (ref 22–33)
CO2 BLDA-SCNC: 27 MMOL/L (ref 24–29)
CO2 BLDA-SCNC: 28 MMOL/L (ref 24–29)
CO2 BLDA-SCNC: 28.4 MMOL/L (ref 22–33)
CO2 BLDA-SCNC: 28.7 MMOL/L (ref 22–33)
CO2 BLDA-SCNC: 29 MMOL/L (ref 24–29)
CO2 BLDA-SCNC: 31 MMOL/L (ref 24–29)
CO2 SERPL-SCNC: 25 MMOL/L (ref 22–29)
CO2 SERPL-SCNC: 25 MMOL/L (ref 22–29)
COHGB MFR BLD: 1.3 % (ref 0–2)
COHGB MFR BLD: 1.5 % (ref 0–2)
COHGB MFR BLD: 1.6 % (ref 0–2)
CREAT BLD-MCNC: 0.95 MG/DL (ref 0.76–1.27)
CREAT BLD-MCNC: 1.18 MG/DL (ref 0.76–1.27)
DEPRECATED RDW RBC AUTO: 44.1 FL (ref 37–54)
DEPRECATED RDW RBC AUTO: 45.1 FL (ref 37–54)
ERYTHROCYTE [DISTWIDTH] IN BLOOD BY AUTOMATED COUNT: 14.6 % (ref 12.3–15.4)
ERYTHROCYTE [DISTWIDTH] IN BLOOD BY AUTOMATED COUNT: 14.6 % (ref 12.3–15.4)
GFR SERPL CREATININE-BSD FRML MDRD: 63 ML/MIN/1.73
GFR SERPL CREATININE-BSD FRML MDRD: 81 ML/MIN/1.73
GLUCOSE BLD-MCNC: 123 MG/DL (ref 65–99)
GLUCOSE BLD-MCNC: 141 MG/DL (ref 65–99)
GLUCOSE BLDC GLUCOMTR-MCNC: 101 MG/DL (ref 70–130)
GLUCOSE BLDC GLUCOMTR-MCNC: 126 MG/DL (ref 70–130)
GLUCOSE BLDC GLUCOMTR-MCNC: 127 MG/DL (ref 70–130)
GLUCOSE BLDC GLUCOMTR-MCNC: 128 MG/DL (ref 70–130)
GLUCOSE BLDC GLUCOMTR-MCNC: 132 MG/DL (ref 70–130)
GLUCOSE BLDC GLUCOMTR-MCNC: 133 MG/DL (ref 70–130)
GLUCOSE BLDC GLUCOMTR-MCNC: 133 MG/DL (ref 70–130)
GLUCOSE BLDC GLUCOMTR-MCNC: 135 MG/DL (ref 70–130)
GLUCOSE BLDC GLUCOMTR-MCNC: 138 MG/DL (ref 70–130)
GLUCOSE BLDC GLUCOMTR-MCNC: 138 MG/DL (ref 70–130)
GLUCOSE BLDC GLUCOMTR-MCNC: 139 MG/DL (ref 70–130)
GLUCOSE BLDC GLUCOMTR-MCNC: 140 MG/DL (ref 70–130)
GLUCOSE BLDC GLUCOMTR-MCNC: 145 MG/DL (ref 70–130)
GLUCOSE BLDC GLUCOMTR-MCNC: 150 MG/DL (ref 70–130)
GLUCOSE BLDC GLUCOMTR-MCNC: 161 MG/DL (ref 70–130)
HCO3 BLDA-SCNC: 23.1 MMOL/L (ref 22–26)
HCO3 BLDA-SCNC: 24.3 MMOL/L (ref 22–26)
HCO3 BLDA-SCNC: 26 MMOL/L (ref 20–26)
HCO3 BLDA-SCNC: 26.1 MMOL/L (ref 22–26)
HCO3 BLDA-SCNC: 26.7 MMOL/L (ref 22–26)
HCO3 BLDA-SCNC: 27 MMOL/L (ref 20–26)
HCO3 BLDA-SCNC: 27.1 MMOL/L (ref 20–26)
HCO3 BLDA-SCNC: 27.6 MMOL/L (ref 22–26)
HCO3 BLDA-SCNC: 29.8 MMOL/L (ref 22–26)
HCT VFR BLD AUTO: 30.4 % (ref 37.5–51)
HCT VFR BLD AUTO: 31.4 % (ref 37.5–51)
HCT VFR BLD CALC: 29.6 %
HCT VFR BLD CALC: 31 %
HCT VFR BLD CALC: 31.5 %
HCT VFR BLDA CALC: 24 % (ref 38–51)
HCT VFR BLDA CALC: 26 % (ref 38–51)
HCT VFR BLDA CALC: 28 % (ref 38–51)
HCT VFR BLDA CALC: 30 % (ref 38–51)
HCT VFR BLDA CALC: 31 % (ref 38–51)
HCT VFR BLDA CALC: 35 % (ref 38–51)
HGB BLD-MCNC: 9.4 G/DL (ref 13–17.7)
HGB BLD-MCNC: 9.8 G/DL (ref 13–17.7)
HGB BLDA-MCNC: 10.1 G/DL (ref 13.5–17.5)
HGB BLDA-MCNC: 10.2 G/DL (ref 12–17)
HGB BLDA-MCNC: 10.3 G/DL (ref 13.5–17.5)
HGB BLDA-MCNC: 10.5 G/DL (ref 12–17)
HGB BLDA-MCNC: 11.9 G/DL (ref 12–17)
HGB BLDA-MCNC: 8.2 G/DL (ref 12–17)
HGB BLDA-MCNC: 8.8 G/DL (ref 12–17)
HGB BLDA-MCNC: 9.5 G/DL (ref 12–17)
HGB BLDA-MCNC: 9.6 G/DL (ref 13.5–17.5)
HOROWITZ INDEX BLD+IHG-RTO: 100 %
HOROWITZ INDEX BLD+IHG-RTO: 45 %
HOROWITZ INDEX BLD+IHG-RTO: 45 %
INR PPP: 1.41 (ref 0.85–1.16)
MAGNESIUM SERPL-MCNC: 2.8 MG/DL (ref 1.6–2.4)
MAGNESIUM SERPL-MCNC: 3.2 MG/DL (ref 1.6–2.4)
MCH RBC QN AUTO: 26.1 PG (ref 26.6–33)
MCH RBC QN AUTO: 26.3 PG (ref 26.6–33)
MCHC RBC AUTO-ENTMCNC: 30.9 G/DL (ref 31.5–35.7)
MCHC RBC AUTO-ENTMCNC: 31.2 G/DL (ref 31.5–35.7)
MCV RBC AUTO: 83.7 FL (ref 79–97)
MCV RBC AUTO: 85.2 FL (ref 79–97)
METHGB BLD QL: 0.9 % (ref 0–1.5)
METHGB BLD QL: 1 % (ref 0–1.5)
METHGB BLD QL: 1.1 % (ref 0–1.5)
MODALITY: ABNORMAL
NOTE: ABNORMAL
OXYHGB MFR BLDV: 91.1 % (ref 94–99)
OXYHGB MFR BLDV: 93.8 % (ref 94–99)
OXYHGB MFR BLDV: 95.4 % (ref 94–99)
PA ADP PRP-ACNC: 275 PRU
PCO2 BLDA: 33.9 MM HG (ref 35–45)
PCO2 BLDA: 35.4 MM HG (ref 35–45)
PCO2 BLDA: 36.8 MM HG (ref 35–45)
PCO2 BLDA: 37.3 MM HG (ref 35–45)
PCO2 BLDA: 38.2 MM HG (ref 35–45)
PCO2 BLDA: 38.6 MM HG (ref 35–45)
PCO2 BLDA: 42 MM HG (ref 35–45)
PCO2 BLDA: 46.5 MM HG (ref 35–45)
PCO2 BLDA: 53.7 MM HG (ref 35–45)
PCO2 TEMP ADJ BLD: 33.9 MM HG (ref 35–48)
PCO2 TEMP ADJ BLD: 46.5 MM HG (ref 35–48)
PCO2 TEMP ADJ BLD: 53.7 MM HG (ref 35–48)
PH BLDA: 7.31 PH UNITS (ref 7.35–7.45)
PH BLDA: 7.37 PH UNITS (ref 7.35–7.45)
PH BLDA: 7.42 PH UNITS (ref 7.35–7.6)
PH BLDA: 7.43 PH UNITS (ref 7.35–7.6)
PH BLDA: 7.45 PH UNITS (ref 7.35–7.6)
PH BLDA: 7.45 PH UNITS (ref 7.35–7.6)
PH BLDA: 7.46 PH UNITS (ref 7.35–7.6)
PH BLDA: 7.47 PH UNITS (ref 7.35–7.6)
PH BLDA: 7.49 PH UNITS (ref 7.35–7.45)
PH, TEMP CORRECTED: 7.31 PH UNITS
PH, TEMP CORRECTED: 7.37 PH UNITS
PH, TEMP CORRECTED: 7.49 PH UNITS
PHOSPHATE SERPL-MCNC: 3.1 MG/DL (ref 2.5–4.5)
PHOSPHATE SERPL-MCNC: 3.4 MG/DL (ref 2.5–4.5)
PLATELET # BLD AUTO: 152 10*3/MM3 (ref 140–450)
PLATELET # BLD AUTO: 153 10*3/MM3 (ref 140–450)
PMV BLD AUTO: 10.9 FL (ref 6–12)
PMV BLD AUTO: 11.6 FL (ref 6–12)
PO2 BLDA: 165 MMHG (ref 80–105)
PO2 BLDA: 284 MMHG (ref 80–105)
PO2 BLDA: 334 MMHG (ref 80–105)
PO2 BLDA: 368 MMHG (ref 80–105)
PO2 BLDA: 48 MMHG (ref 80–105)
PO2 BLDA: 76.2 MM HG (ref 83–108)
PO2 BLDA: 79 MMHG (ref 80–105)
PO2 BLDA: 82 MM HG (ref 83–108)
PO2 BLDA: 82.8 MM HG (ref 83–108)
PO2 TEMP ADJ BLD: 76.2 MM HG (ref 83–108)
PO2 TEMP ADJ BLD: 82 MM HG (ref 83–108)
PO2 TEMP ADJ BLD: 82.8 MM HG (ref 83–108)
POTASSIUM BLD-SCNC: 4.4 MMOL/L (ref 3.5–5.2)
POTASSIUM BLD-SCNC: 4.7 MMOL/L (ref 3.5–5.2)
POTASSIUM BLDA-SCNC: 4.2 MMOL/L (ref 3.5–4.9)
POTASSIUM BLDA-SCNC: 4.3 MMOL/L (ref 3.5–4.9)
POTASSIUM BLDA-SCNC: 4.7 MMOL/L (ref 3.5–4.9)
POTASSIUM BLDA-SCNC: 5.2 MMOL/L (ref 3.5–4.9)
POTASSIUM BLDA-SCNC: 5.2 MMOL/L (ref 3.5–4.9)
POTASSIUM BLDA-SCNC: 5.7 MMOL/L (ref 3.5–4.9)
PROTHROMBIN TIME: 16.9 SECONDS (ref 11.5–14)
RBC # BLD AUTO: 3.57 10*6/MM3 (ref 4.14–5.8)
RBC # BLD AUTO: 3.75 10*6/MM3 (ref 4.14–5.8)
SAO2 % BLDA: 100 % (ref 95–98)
SAO2 % BLDA: 86 % (ref 95–98)
SAO2 % BLDA: 96 % (ref 95–98)
SARS-COV-2 RNA RESP QL NAA+PROBE: NOT DETECTED
SODIUM BLD-SCNC: 138 MMOL/L (ref 136–145)
SODIUM BLD-SCNC: 140 MMOL/L (ref 136–145)
SODIUM BLDA-SCNC: 132 MMOL/L (ref 138–146)
SODIUM BLDA-SCNC: 132 MMOL/L (ref 138–146)
SODIUM BLDA-SCNC: 133 MMOL/L (ref 138–146)
SODIUM BLDA-SCNC: 135 MMOL/L (ref 138–146)
SODIUM BLDA-SCNC: 137 MMOL/L (ref 138–146)
SODIUM BLDA-SCNC: 139 MMOL/L (ref 138–146)
VENTILATOR MODE: ABNORMAL
WBC NRBC COR # BLD: 5.3 10*3/MM3 (ref 3.4–10.8)
WBC NRBC COR # BLD: 6.86 10*3/MM3 (ref 3.4–10.8)

## 2020-06-18 PROCEDURE — 25010000002 AMIODARONE PER 30 MG: Performed by: ANESTHESIOLOGY

## 2020-06-18 PROCEDURE — 02RG08Z REPLACEMENT OF MITRAL VALVE WITH ZOOPLASTIC TISSUE, OPEN APPROACH: ICD-10-PCS | Performed by: THORACIC SURGERY (CARDIOTHORACIC VASCULAR SURGERY)

## 2020-06-18 PROCEDURE — 25010000003 CEFUROXIME SODIUM 1.5 G RECONSTITUTED SOLUTION: Performed by: PHYSICIAN ASSISTANT

## 2020-06-18 PROCEDURE — 71045 X-RAY EXAM CHEST 1 VIEW: CPT

## 2020-06-18 PROCEDURE — 83735 ASSAY OF MAGNESIUM: CPT | Performed by: PHYSICIAN ASSISTANT

## 2020-06-18 PROCEDURE — 25010000002 MORPHINE PER 10 MG: Performed by: THORACIC SURGERY (CARDIOTHORACIC VASCULAR SURGERY)

## 2020-06-18 PROCEDURE — 99024 POSTOP FOLLOW-UP VISIT: CPT | Performed by: THORACIC SURGERY (CARDIOTHORACIC VASCULAR SURGERY)

## 2020-06-18 PROCEDURE — P9035 PLATELET PHERES LEUKOREDUCED: HCPCS

## 2020-06-18 PROCEDURE — 25010000002 PROTAMINE SULFATE PER 10 MG: Performed by: ANESTHESIOLOGY

## 2020-06-18 PROCEDURE — 85610 PROTHROMBIN TIME: CPT | Performed by: PHYSICIAN ASSISTANT

## 2020-06-18 PROCEDURE — C1751 CATH, INF, PER/CENT/MIDLINE: HCPCS | Performed by: ANESTHESIOLOGY

## 2020-06-18 PROCEDURE — C1894 INTRO/SHEATH, NON-LASER: HCPCS | Performed by: THORACIC SURGERY (CARDIOTHORACIC VASCULAR SURGERY)

## 2020-06-18 PROCEDURE — 94799 UNLISTED PULMONARY SVC/PX: CPT

## 2020-06-18 PROCEDURE — 33430 REPLACEMENT OF MITRAL VALVE: CPT | Performed by: PHYSICIAN ASSISTANT

## 2020-06-18 PROCEDURE — 82947 ASSAY GLUCOSE BLOOD QUANT: CPT

## 2020-06-18 PROCEDURE — 85576 BLOOD PLATELET AGGREGATION: CPT | Performed by: PHYSICIAN ASSISTANT

## 2020-06-18 PROCEDURE — P9041 ALBUMIN (HUMAN),5%, 50ML: HCPCS | Performed by: PHYSICIAN ASSISTANT

## 2020-06-18 PROCEDURE — 25010000002 PROPOFOL 10 MG/ML EMULSION: Performed by: THORACIC SURGERY (CARDIOTHORACIC VASCULAR SURGERY)

## 2020-06-18 PROCEDURE — 25010000002 FENTANYL CITRATE (PF) 100 MCG/2ML SOLUTION: Performed by: THORACIC SURGERY (CARDIOTHORACIC VASCULAR SURGERY)

## 2020-06-18 PROCEDURE — 82803 BLOOD GASES ANY COMBINATION: CPT

## 2020-06-18 PROCEDURE — 99233 SBSQ HOSP IP/OBS HIGH 50: CPT | Performed by: INTERNAL MEDICINE

## 2020-06-18 PROCEDURE — 25010000002 HEPARIN (PORCINE) PER 1000 UNITS: Performed by: ANESTHESIOLOGY

## 2020-06-18 PROCEDURE — 02L70CK OCCLUSION OF LEFT ATRIAL APPENDAGE WITH EXTRALUMINAL DEVICE, OPEN APPROACH: ICD-10-PCS | Performed by: THORACIC SURGERY (CARDIOTHORACIC VASCULAR SURGERY)

## 2020-06-18 PROCEDURE — 84132 ASSAY OF SERUM POTASSIUM: CPT

## 2020-06-18 PROCEDURE — 25010000002 HEPARIN (PORCINE) PER 1000 UNITS: Performed by: THORACIC SURGERY (CARDIOTHORACIC VASCULAR SURGERY)

## 2020-06-18 PROCEDURE — 82330 ASSAY OF CALCIUM: CPT | Performed by: PHYSICIAN ASSISTANT

## 2020-06-18 PROCEDURE — B24BZZ4 ULTRASONOGRAPHY OF HEART WITH AORTA, TRANSESOPHAGEAL: ICD-10-PCS | Performed by: ANESTHESIOLOGY

## 2020-06-18 PROCEDURE — 25010000002 VANCOMYCIN PER 500 MG: Performed by: THORACIC SURGERY (CARDIOTHORACIC VASCULAR SURGERY)

## 2020-06-18 PROCEDURE — 85347 COAGULATION TIME ACTIVATED: CPT

## 2020-06-18 PROCEDURE — 94770: CPT

## 2020-06-18 PROCEDURE — 36430 TRANSFUSION BLD/BLD COMPNT: CPT

## 2020-06-18 PROCEDURE — 88305 TISSUE EXAM BY PATHOLOGIST: CPT | Performed by: THORACIC SURGERY (CARDIOTHORACIC VASCULAR SURGERY)

## 2020-06-18 PROCEDURE — 25010000002 PROTAMINE SULFATE PER 10 MG: Performed by: PHYSICIAN ASSISTANT

## 2020-06-18 PROCEDURE — 94002 VENT MGMT INPAT INIT DAY: CPT

## 2020-06-18 PROCEDURE — 93010 ELECTROCARDIOGRAM REPORT: CPT | Performed by: INTERNAL MEDICINE

## 2020-06-18 PROCEDURE — 25010000002 PHENYLEPHRINE PER 1 ML: Performed by: ANESTHESIOLOGY

## 2020-06-18 PROCEDURE — 82330 ASSAY OF CALCIUM: CPT

## 2020-06-18 PROCEDURE — 93318 ECHO TRANSESOPHAGEAL INTRAOP: CPT | Performed by: ANESTHESIOLOGY

## 2020-06-18 PROCEDURE — 80069 RENAL FUNCTION PANEL: CPT | Performed by: PHYSICIAN ASSISTANT

## 2020-06-18 PROCEDURE — 5A1221Z PERFORMANCE OF CARDIAC OUTPUT, CONTINUOUS: ICD-10-PCS | Performed by: THORACIC SURGERY (CARDIOTHORACIC VASCULAR SURGERY)

## 2020-06-18 PROCEDURE — 25010000002 ALBUMIN HUMAN 5% PER 50 ML: Performed by: PHYSICIAN ASSISTANT

## 2020-06-18 PROCEDURE — 85027 COMPLETE CBC AUTOMATED: CPT | Performed by: PHYSICIAN ASSISTANT

## 2020-06-18 PROCEDURE — 82805 BLOOD GASES W/O2 SATURATION: CPT

## 2020-06-18 PROCEDURE — 25010000002 PROPOFOL 10 MG/ML EMULSION: Performed by: ANESTHESIOLOGY

## 2020-06-18 PROCEDURE — 85014 HEMATOCRIT: CPT

## 2020-06-18 PROCEDURE — 84295 ASSAY OF SERUM SODIUM: CPT

## 2020-06-18 PROCEDURE — 25810000003 DEXTROSE 5 % WITH KCL 20 MEQ 20-5 MEQ/L-% SOLUTION: Performed by: PHYSICIAN ASSISTANT

## 2020-06-18 PROCEDURE — 85730 THROMBOPLASTIN TIME PARTIAL: CPT | Performed by: PHYSICIAN ASSISTANT

## 2020-06-18 PROCEDURE — 25010000002 MIDAZOLAM PER 1 MG: Performed by: ANESTHESIOLOGY

## 2020-06-18 PROCEDURE — 33430 REPLACEMENT OF MITRAL VALVE: CPT | Performed by: THORACIC SURGERY (CARDIOTHORACIC VASCULAR SURGERY)

## 2020-06-18 PROCEDURE — 93005 ELECTROCARDIOGRAM TRACING: CPT | Performed by: PHYSICIAN ASSISTANT

## 2020-06-18 DEVICE — VLV MITRAL EPIC PORCN 29MM: Type: IMPLANTABLE DEVICE | Status: FUNCTIONAL

## 2020-06-18 DEVICE — APPL CLIP LAA ATRICLIP FLX 35MM: Type: IMPLANTABLE DEVICE | Status: FUNCTIONAL

## 2020-06-18 RX ORDER — MEPERIDINE HYDROCHLORIDE 25 MG/ML
25 INJECTION INTRAMUSCULAR; INTRAVENOUS; SUBCUTANEOUS EVERY 4 HOURS PRN
Status: ACTIVE | OUTPATIENT
Start: 2020-06-18 | End: 2020-06-18

## 2020-06-18 RX ORDER — AMINOCAPROIC ACID 250 MG/ML
INJECTION, SOLUTION INTRAVENOUS AS NEEDED
Status: DISCONTINUED | OUTPATIENT
Start: 2020-06-18 | End: 2020-06-18 | Stop reason: SURG

## 2020-06-18 RX ORDER — POTASSIUM CHLORIDE 29.8 MG/ML
20 INJECTION INTRAVENOUS
Status: DISCONTINUED | OUTPATIENT
Start: 2020-06-18 | End: 2020-06-19

## 2020-06-18 RX ORDER — SODIUM CHLORIDE, SODIUM LACTATE, POTASSIUM CHLORIDE, CALCIUM CHLORIDE 600; 310; 30; 20 MG/100ML; MG/100ML; MG/100ML; MG/100ML
9 INJECTION, SOLUTION INTRAVENOUS CONTINUOUS
Status: DISCONTINUED | OUTPATIENT
Start: 2020-06-18 | End: 2020-06-18

## 2020-06-18 RX ORDER — PROTAMINE SULFATE 10 MG/ML
INJECTION, SOLUTION INTRAVENOUS AS NEEDED
Status: DISCONTINUED | OUTPATIENT
Start: 2020-06-18 | End: 2020-06-18 | Stop reason: SURG

## 2020-06-18 RX ORDER — VANCOMYCIN HYDROCHLORIDE 500 MG/10ML
INJECTION, POWDER, LYOPHILIZED, FOR SOLUTION INTRAVENOUS AS NEEDED
Status: DISCONTINUED | OUTPATIENT
Start: 2020-06-18 | End: 2020-06-18 | Stop reason: HOSPADM

## 2020-06-18 RX ORDER — SODIUM CHLORIDE 9 MG/ML
INJECTION, SOLUTION INTRAVENOUS AS NEEDED
Status: DISCONTINUED | OUTPATIENT
Start: 2020-06-18 | End: 2020-06-18 | Stop reason: HOSPADM

## 2020-06-18 RX ORDER — ROCURONIUM BROMIDE 10 MG/ML
INJECTION, SOLUTION INTRAVENOUS AS NEEDED
Status: DISCONTINUED | OUTPATIENT
Start: 2020-06-18 | End: 2020-06-18 | Stop reason: SURG

## 2020-06-18 RX ORDER — NALOXONE HCL 0.4 MG/ML
0.4 VIAL (ML) INJECTION
Status: DISCONTINUED | OUTPATIENT
Start: 2020-06-18 | End: 2020-06-19

## 2020-06-18 RX ORDER — POTASSIUM CHLORIDE 1.5 G/1.77G
40 POWDER, FOR SOLUTION ORAL AS NEEDED
Status: DISCONTINUED | OUTPATIENT
Start: 2020-06-18 | End: 2020-06-23 | Stop reason: HOSPADM

## 2020-06-18 RX ORDER — ALBUTEROL SULFATE 2.5 MG/3ML
2.5 SOLUTION RESPIRATORY (INHALATION) EVERY 4 HOURS PRN
Status: DISPENSED | OUTPATIENT
Start: 2020-06-18 | End: 2020-06-19

## 2020-06-18 RX ORDER — VECURONIUM BROMIDE 1 MG/ML
INJECTION, POWDER, LYOPHILIZED, FOR SOLUTION INTRAVENOUS AS NEEDED
Status: DISCONTINUED | OUTPATIENT
Start: 2020-06-18 | End: 2020-06-18 | Stop reason: SURG

## 2020-06-18 RX ORDER — FENTANYL CITRATE 50 UG/ML
25 INJECTION, SOLUTION INTRAMUSCULAR; INTRAVENOUS
Status: DISCONTINUED | OUTPATIENT
Start: 2020-06-18 | End: 2020-06-19

## 2020-06-18 RX ORDER — SODIUM CHLORIDE 0.9 % (FLUSH) 0.9 %
30 SYRINGE (ML) INJECTION ONCE AS NEEDED
Status: DISCONTINUED | OUTPATIENT
Start: 2020-06-18 | End: 2020-06-19

## 2020-06-18 RX ORDER — SODIUM CHLORIDE 9 MG/ML
30 INJECTION, SOLUTION INTRAVENOUS CONTINUOUS PRN
Status: DISCONTINUED | OUTPATIENT
Start: 2020-06-18 | End: 2020-06-19

## 2020-06-18 RX ORDER — ATORVASTATIN CALCIUM 40 MG/1
40 TABLET, FILM COATED ORAL NIGHTLY
Status: DISCONTINUED | OUTPATIENT
Start: 2020-06-18 | End: 2020-06-23 | Stop reason: HOSPADM

## 2020-06-18 RX ORDER — POTASSIUM CHLORIDE 750 MG/1
40 CAPSULE, EXTENDED RELEASE ORAL AS NEEDED
Status: DISCONTINUED | OUTPATIENT
Start: 2020-06-18 | End: 2020-06-23 | Stop reason: HOSPADM

## 2020-06-18 RX ORDER — HEPARIN SODIUM 1000 [USP'U]/ML
INJECTION, SOLUTION INTRAVENOUS; SUBCUTANEOUS AS NEEDED
Status: DISCONTINUED | OUTPATIENT
Start: 2020-06-18 | End: 2020-06-18 | Stop reason: SURG

## 2020-06-18 RX ORDER — CALCIUM CHLORIDE 100 MG/ML
INJECTION INTRAVENOUS; INTRAVENTRICULAR AS NEEDED
Status: DISCONTINUED | OUTPATIENT
Start: 2020-06-18 | End: 2020-06-18 | Stop reason: SURG

## 2020-06-18 RX ORDER — DOPAMINE HYDROCHLORIDE 160 MG/100ML
2-20 INJECTION, SOLUTION INTRAVENOUS CONTINUOUS PRN
Status: DISCONTINUED | OUTPATIENT
Start: 2020-06-18 | End: 2020-06-19

## 2020-06-18 RX ORDER — POTASSIUM CHLORIDE, DEXTROSE MONOHYDRATE 150; 5 MG/100ML; G/100ML
30 INJECTION, SOLUTION INTRAVENOUS CONTINUOUS
Status: DISCONTINUED | OUTPATIENT
Start: 2020-06-18 | End: 2020-06-18

## 2020-06-18 RX ORDER — PROTAMINE SULFATE 10 MG/ML
50 INJECTION, SOLUTION INTRAVENOUS ONCE
Status: COMPLETED | OUTPATIENT
Start: 2020-06-18 | End: 2020-06-18

## 2020-06-18 RX ORDER — MORPHINE SULFATE 2 MG/ML
2 INJECTION, SOLUTION INTRAMUSCULAR; INTRAVENOUS
Status: DISCONTINUED | OUTPATIENT
Start: 2020-06-18 | End: 2020-06-19

## 2020-06-18 RX ORDER — DOBUTAMINE HYDROCHLORIDE 100 MG/100ML
2-20 INJECTION INTRAVENOUS CONTINUOUS PRN
Status: DISCONTINUED | OUTPATIENT
Start: 2020-06-18 | End: 2020-06-19

## 2020-06-18 RX ORDER — AMIODARONE HYDROCHLORIDE 50 MG/ML
INJECTION, SOLUTION INTRAVENOUS AS NEEDED
Status: DISCONTINUED | OUTPATIENT
Start: 2020-06-18 | End: 2020-06-18 | Stop reason: SURG

## 2020-06-18 RX ORDER — HYDROCODONE BITARTRATE AND ACETAMINOPHEN 7.5; 325 MG/1; MG/1
1 TABLET ORAL EVERY 4 HOURS PRN
Status: DISCONTINUED | OUTPATIENT
Start: 2020-06-18 | End: 2020-06-23 | Stop reason: HOSPADM

## 2020-06-18 RX ORDER — NITROGLYCERIN 20 MG/100ML
5-200 INJECTION INTRAVENOUS CONTINUOUS PRN
Status: DISCONTINUED | OUTPATIENT
Start: 2020-06-18 | End: 2020-06-22

## 2020-06-18 RX ORDER — LIDOCAINE HYDROCHLORIDE 10 MG/ML
0.5 INJECTION, SOLUTION EPIDURAL; INFILTRATION; INTRACAUDAL; PERINEURAL ONCE AS NEEDED
Status: DISCONTINUED | OUTPATIENT
Start: 2020-06-18 | End: 2020-06-18 | Stop reason: HOSPADM

## 2020-06-18 RX ORDER — MIDAZOLAM HYDROCHLORIDE 1 MG/ML
INJECTION INTRAMUSCULAR; INTRAVENOUS AS NEEDED
Status: DISCONTINUED | OUTPATIENT
Start: 2020-06-18 | End: 2020-06-18 | Stop reason: SURG

## 2020-06-18 RX ORDER — SUFENTANIL CITRATE 50 UG/ML
INJECTION EPIDURAL; INTRAVENOUS AS NEEDED
Status: DISCONTINUED | OUTPATIENT
Start: 2020-06-18 | End: 2020-06-18 | Stop reason: SURG

## 2020-06-18 RX ORDER — OXYCODONE HYDROCHLORIDE AND ACETAMINOPHEN 5; 325 MG/1; MG/1
2 TABLET ORAL EVERY 4 HOURS PRN
Status: DISCONTINUED | OUTPATIENT
Start: 2020-06-18 | End: 2020-06-19

## 2020-06-18 RX ORDER — AMOXICILLIN 250 MG
2 CAPSULE ORAL 2 TIMES DAILY
Status: DISCONTINUED | OUTPATIENT
Start: 2020-06-18 | End: 2020-06-23 | Stop reason: HOSPADM

## 2020-06-18 RX ORDER — METOPROLOL TARTRATE 5 MG/5ML
2.5 INJECTION INTRAVENOUS EVERY 6 HOURS SCHEDULED
Status: DISCONTINUED | OUTPATIENT
Start: 2020-06-18 | End: 2020-06-19

## 2020-06-18 RX ORDER — PHENYLEPHRINE HCL IN 0.9% NACL 0.5 MG/5ML
.5-3 SYRINGE (ML) INTRAVENOUS CONTINUOUS PRN
Status: DISCONTINUED | OUTPATIENT
Start: 2020-06-18 | End: 2020-06-22

## 2020-06-18 RX ORDER — FAMOTIDINE 20 MG/1
20 TABLET, FILM COATED ORAL ONCE
Status: COMPLETED | OUTPATIENT
Start: 2020-06-18 | End: 2020-06-18

## 2020-06-18 RX ORDER — DEXMEDETOMIDINE HYDROCHLORIDE 4 UG/ML
.2-1.5 INJECTION, SOLUTION INTRAVENOUS CONTINUOUS PRN
Status: DISCONTINUED | OUTPATIENT
Start: 2020-06-18 | End: 2020-06-19

## 2020-06-18 RX ORDER — NOREPINEPHRINE BIT/0.9 % NACL 8 MG/250ML
.02-.3 INFUSION BOTTLE (ML) INTRAVENOUS CONTINUOUS PRN
Status: DISCONTINUED | OUTPATIENT
Start: 2020-06-18 | End: 2020-06-22

## 2020-06-18 RX ORDER — ALBUMIN, HUMAN INJ 5% 5 %
500 SOLUTION INTRAVENOUS AS NEEDED
Status: COMPLETED | OUTPATIENT
Start: 2020-06-18 | End: 2020-06-18

## 2020-06-18 RX ORDER — ASPIRIN 325 MG
325 TABLET ORAL ONCE
Status: DISCONTINUED | OUTPATIENT
Start: 2020-06-18 | End: 2020-06-18

## 2020-06-18 RX ORDER — SODIUM CHLORIDE 9 MG/ML
INJECTION, SOLUTION INTRAVENOUS CONTINUOUS PRN
Status: DISCONTINUED | OUTPATIENT
Start: 2020-06-18 | End: 2020-06-18 | Stop reason: SURG

## 2020-06-18 RX ORDER — CHLORHEXIDINE GLUCONATE 0.12 MG/ML
15 RINSE ORAL EVERY 12 HOURS SCHEDULED
Status: DISCONTINUED | OUTPATIENT
Start: 2020-06-18 | End: 2020-06-19

## 2020-06-18 RX ORDER — LIDOCAINE HYDROCHLORIDE 20 MG/ML
INJECTION, SOLUTION INFILTRATION; PERINEURAL AS NEEDED
Status: DISCONTINUED | OUTPATIENT
Start: 2020-06-18 | End: 2020-06-18 | Stop reason: SURG

## 2020-06-18 RX ORDER — POTASSIUM CHLORIDE, DEXTROSE MONOHYDRATE 150; 5 MG/100ML; G/100ML
30 INJECTION, SOLUTION INTRAVENOUS CONTINUOUS
Status: DISCONTINUED | OUTPATIENT
Start: 2020-06-18 | End: 2020-06-19

## 2020-06-18 RX ORDER — PROPOFOL 10 MG/ML
VIAL (ML) INTRAVENOUS CONTINUOUS PRN
Status: DISCONTINUED | OUTPATIENT
Start: 2020-06-18 | End: 2020-06-18 | Stop reason: SURG

## 2020-06-18 RX ORDER — ONDANSETRON 2 MG/ML
4 INJECTION INTRAMUSCULAR; INTRAVENOUS EVERY 6 HOURS PRN
Status: DISCONTINUED | OUTPATIENT
Start: 2020-06-18 | End: 2020-06-23 | Stop reason: HOSPADM

## 2020-06-18 RX ORDER — PROPOFOL 10 MG/ML
VIAL (ML) INTRAVENOUS AS NEEDED
Status: DISCONTINUED | OUTPATIENT
Start: 2020-06-18 | End: 2020-06-18 | Stop reason: SURG

## 2020-06-18 RX ADMIN — AMINOCAPROIC ACID 10 G: 250 INJECTION, SOLUTION INTRAVENOUS at 09:12

## 2020-06-18 RX ADMIN — MORPHINE SULFATE 2 MG: 2 INJECTION, SOLUTION INTRAMUSCULAR; INTRAVENOUS at 15:56

## 2020-06-18 RX ADMIN — SUFENTANIL CITRATE 50 MCG: 50 INJECTION, SOLUTION EPIDURAL; INTRAVENOUS at 07:22

## 2020-06-18 RX ADMIN — SODIUM CHLORIDE, POTASSIUM CHLORIDE, SODIUM LACTATE AND CALCIUM CHLORIDE 9 ML/HR: 600; 310; 30; 20 INJECTION, SOLUTION INTRAVENOUS at 06:38

## 2020-06-18 RX ADMIN — HEPARIN SODIUM 30000 UNITS: 1000 INJECTION, SOLUTION INTRAVENOUS; SUBCUTANEOUS at 07:49

## 2020-06-18 RX ADMIN — SENNOSIDES AND DOCUSATE SODIUM 2 TABLET: 8.6; 5 TABLET ORAL at 20:12

## 2020-06-18 RX ADMIN — OXYCODONE HYDROCHLORIDE AND ACETAMINOPHEN 2 TABLET: 5; 325 TABLET ORAL at 14:37

## 2020-06-18 RX ADMIN — FAMOTIDINE 20 MG: 20 TABLET, FILM COATED ORAL at 06:38

## 2020-06-18 RX ADMIN — SODIUM CHLORIDE: 9 INJECTION, SOLUTION INTRAVENOUS at 07:16

## 2020-06-18 RX ADMIN — DEXMEDETOMIDINE HYDROCHLORIDE 0.6 MCG/KG/HR: 4 INJECTION, SOLUTION INTRAVENOUS at 14:51

## 2020-06-18 RX ADMIN — PROTAMINE SULFATE 50 MG: 10 INJECTION, SOLUTION INTRAVENOUS at 10:52

## 2020-06-18 RX ADMIN — AMIODARONE HYDROCHLORIDE 300 MG: 50 INJECTION, SOLUTION INTRAVENOUS at 08:58

## 2020-06-18 RX ADMIN — MIDAZOLAM 1 MG: 1 INJECTION INTRAMUSCULAR; INTRAVENOUS at 07:01

## 2020-06-18 RX ADMIN — EPHEDRINE SULFATE 5 MG: 50 INJECTION INTRAMUSCULAR; INTRAVENOUS; SUBCUTANEOUS at 09:11

## 2020-06-18 RX ADMIN — MORPHINE SULFATE 2 MG: 2 INJECTION, SOLUTION INTRAMUSCULAR; INTRAVENOUS at 19:17

## 2020-06-18 RX ADMIN — PHENYLEPHRINE HYDROCHLORIDE 100 MCG: 10 INJECTION INTRAVENOUS at 08:04

## 2020-06-18 RX ADMIN — INSULIN HUMAN 2 UNITS/HR: 1 INJECTION, SOLUTION INTRAVENOUS at 16:13

## 2020-06-18 RX ADMIN — PROTAMINE SULFATE 50 MG: 10 INJECTION, SOLUTION INTRAVENOUS at 09:24

## 2020-06-18 RX ADMIN — VECURONIUM BROMIDE 3 MG: 1 INJECTION, POWDER, LYOPHILIZED, FOR SOLUTION INTRAVENOUS at 08:03

## 2020-06-18 RX ADMIN — SODIUM CHLORIDE, PRESERVATIVE FREE 10 ML: 5 INJECTION INTRAVENOUS at 20:12

## 2020-06-18 RX ADMIN — SUFENTANIL CITRATE 25 MCG: 50 INJECTION, SOLUTION EPIDURAL; INTRAVENOUS at 07:04

## 2020-06-18 RX ADMIN — MUPIROCIN 1 APPLICATION: 20 OINTMENT TOPICAL at 05:02

## 2020-06-18 RX ADMIN — SUFENTANIL CITRATE 75 MCG: 50 INJECTION, SOLUTION EPIDURAL; INTRAVENOUS at 08:55

## 2020-06-18 RX ADMIN — CALCIUM CHLORIDE 1 G: 100 INJECTION INTRAVENOUS; INTRAVENTRICULAR at 08:55

## 2020-06-18 RX ADMIN — PROPOFOL 25 MCG/KG/MIN: 10 INJECTION, EMULSION INTRAVENOUS at 10:02

## 2020-06-18 RX ADMIN — CHLORHEXIDINE GLUCONATE 0.12% ORAL RINSE 15 ML: 1.2 LIQUID ORAL at 05:02

## 2020-06-18 RX ADMIN — CHLORHEXIDINE GLUCONATE 0.12% ORAL RINSE 15 ML: 1.2 LIQUID ORAL at 12:35

## 2020-06-18 RX ADMIN — OXYCODONE HYDROCHLORIDE AND ACETAMINOPHEN 2 TABLET: 5; 325 TABLET ORAL at 20:12

## 2020-06-18 RX ADMIN — CALCIUM CHLORIDE 0.5 G: 100 INJECTION INTRAVENOUS; INTRAVENTRICULAR at 09:26

## 2020-06-18 RX ADMIN — SUFENTANIL CITRATE 50 MCG: 50 INJECTION, SOLUTION EPIDURAL; INTRAVENOUS at 07:39

## 2020-06-18 RX ADMIN — FENTANYL CITRATE 25 MCG: 0.05 INJECTION, SOLUTION INTRAMUSCULAR; INTRAVENOUS at 11:59

## 2020-06-18 RX ADMIN — SODIUM CHLORIDE, POTASSIUM CHLORIDE, SODIUM LACTATE AND CALCIUM CHLORIDE: 600; 310; 30; 20 INJECTION, SOLUTION INTRAVENOUS at 06:59

## 2020-06-18 RX ADMIN — CEFUROXIME SODIUM 1.5 G: 1.5 INJECTION, POWDER, FOR SOLUTION INTRAVENOUS at 17:20

## 2020-06-18 RX ADMIN — ATORVASTATIN CALCIUM 40 MG: 40 TABLET, FILM COATED ORAL at 20:12

## 2020-06-18 RX ADMIN — METOPROLOL SUCCINATE 100 MG: 100 TABLET, EXTENDED RELEASE ORAL at 05:47

## 2020-06-18 RX ADMIN — ROCURONIUM BROMIDE 100 MG: 10 INJECTION INTRAVENOUS at 07:05

## 2020-06-18 RX ADMIN — SUFENTANIL CITRATE 25 MCG: 50 INJECTION, SOLUTION EPIDURAL; INTRAVENOUS at 09:31

## 2020-06-18 RX ADMIN — PROTAMINE SULFATE 300 MG: 10 INJECTION, SOLUTION INTRAVENOUS at 09:04

## 2020-06-18 RX ADMIN — ALBUMIN HUMAN 500 ML: 0.05 INJECTION, SOLUTION INTRAVENOUS at 15:57

## 2020-06-18 RX ADMIN — FENTANYL CITRATE 25 MCG: 0.05 INJECTION, SOLUTION INTRAMUSCULAR; INTRAVENOUS at 14:06

## 2020-06-18 RX ADMIN — SUFENTANIL CITRATE 25 MCG: 50 INJECTION, SOLUTION EPIDURAL; INTRAVENOUS at 07:26

## 2020-06-18 RX ADMIN — POTASSIUM CHLORIDE AND DEXTROSE MONOHYDRATE 30 ML/HR: 150; 5 INJECTION, SOLUTION INTRAVENOUS at 10:24

## 2020-06-18 RX ADMIN — CHLORHEXIDINE GLUCONATE 0.12% ORAL RINSE 15 ML: 1.2 LIQUID ORAL at 20:12

## 2020-06-18 RX ADMIN — LIDOCAINE HYDROCHLORIDE 80 MG: 20 INJECTION, SOLUTION INFILTRATION; PERINEURAL at 07:04

## 2020-06-18 RX ADMIN — PROTAMINE SULFATE 50 MG: 10 INJECTION, SOLUTION INTRAVENOUS at 09:40

## 2020-06-18 RX ADMIN — PROPOFOL 50 MCG/KG/MIN: 10 INJECTION, EMULSION INTRAVENOUS at 12:49

## 2020-06-18 RX ADMIN — FENTANYL CITRATE 25 MCG: 0.05 INJECTION, SOLUTION INTRAMUSCULAR; INTRAVENOUS at 14:50

## 2020-06-18 RX ADMIN — HYDROCODONE BITARTRATE AND ACETAMINOPHEN 1 TABLET: 7.5; 325 TABLET ORAL at 17:57

## 2020-06-18 RX ADMIN — MORPHINE SULFATE 2 MG: 2 INJECTION, SOLUTION INTRAMUSCULAR; INTRAVENOUS at 23:05

## 2020-06-18 RX ADMIN — PROPOFOL 100 MG: 10 INJECTION, EMULSION INTRAVENOUS at 07:04

## 2020-06-18 RX ADMIN — PHENYLEPHRINE HYDROCHLORIDE 100 MCG: 10 INJECTION INTRAVENOUS at 09:10

## 2020-06-18 RX ADMIN — HYDROCODONE BITARTRATE AND ACETAMINOPHEN 1 TABLET: 7.5; 325 TABLET ORAL at 22:12

## 2020-06-18 RX ADMIN — EPHEDRINE SULFATE 5 MG: 50 INJECTION INTRAMUSCULAR; INTRAVENOUS; SUBCUTANEOUS at 09:22

## 2020-06-18 RX ADMIN — SODIUM CHLORIDE, PRESERVATIVE FREE 10 ML: 5 INJECTION INTRAVENOUS at 06:38

## 2020-06-18 RX ADMIN — EPHEDRINE SULFATE 2.5 MG: 50 INJECTION INTRAMUSCULAR; INTRAVENOUS; SUBCUTANEOUS at 07:04

## 2020-06-18 RX ADMIN — MIDAZOLAM 1 MG: 1 INJECTION INTRAMUSCULAR; INTRAVENOUS at 08:00

## 2020-06-18 RX ADMIN — PROPOFOL 25 MCG/KG/MIN: 10 INJECTION, EMULSION INTRAVENOUS at 09:29

## 2020-06-18 RX ADMIN — ALBUMIN HUMAN 500 ML: 0.05 INJECTION, SOLUTION INTRAVENOUS at 10:24

## 2020-06-18 RX ADMIN — AMINOCAPROIC ACID 10 G: 250 INJECTION, SOLUTION INTRAVENOUS at 07:21

## 2020-06-18 NOTE — OP NOTE
Operative Report    Preop Diagnosis: Previous coronary stenting.  Severe mitral valvular insufficiency.      Postoperative Diagnosis: Same      Procedure: Mitral valve replacement with a #29 bioprosthetic epic tissue valve.  Left atrial appendage ligation size 35 atrial clip        Surgeons: Cooper Burden MD surgeon.  Adriana Delacruz PA-C assistant        Indication: This patient was referred to me the above medical problems.  He understood the planned procedure with the incisions involved and the risk of stroke bleed infection death and renal failure agreed to proceed.  He understood the valve choices and options and he requested a bioprosthetic valve.        Description: Supine position sterile prep and drape and antibiotics given.  Median sternotomy performed and the patient fully heparinized.  Cannulas placed in a singular and right atrial appendage patient was on cardiopulmonary bypass aorta crossclamped and antegrade cardioplegia given the left atrium was opened the valve leaflets were somewhat myxomatous in appearance and thickened anterior leaflet was excised part of the posterior leaflet was excised with care taken to try to preserve the chordal structures a size 29 bioprosthetic epic tissue valve was sutured in place with pledgeted Ethibond suture and CO2 was used in the operative field to minimize the risk of air emboli.  The left atrium was closed and the heart was completely de-aired.  Patient was weaned from bypass in his own sinus rhythm the total cross-clamp time was 49 minutes total cardiopulmonary bypass time 55 minutes.  Transesophageal echo following the procedure demonstrated normal prosthetic valvular function with no evidence of paravalvular leak.  The sternum was closed with wire the fashion skin was sutured and the sponge and needle count reported as correct.  Please note I made 4 separate phone calls to notify the family of the patient's status after procedure and none of my calls were  answered.  Please note also I ligated the left atrial appendage with a size 35 atrial clip.  No clot was identified in the atrial appendage by transesophageal echo intraoperatively prior to the procedure.      Please note that portions of this note were completed with a voice recognition program. Efforts were made to edit the dictations, but occasionally words are mistranscribed.

## 2020-06-18 NOTE — ANESTHESIA POSTPROCEDURE EVALUATION
Patient: Rakan Boone    Procedure Summary     Date:  06/18/20 Room / Location:   [facility]  [facility]    Anesthesia Start:  0659 Anesthesia Stop:  1004    Procedure:  MITRAL VALVEREPLACEMENT, MITRAL CLIP, TRANSESOPHAGEAL ECHOCARDIOGRAM (N/A Chest) Diagnosis:       Mitral valve insufficiency, unspecified etiology      (Mitral valve insufficiency, unspecified etiology [I34.0])    Surgeon:  Cooper Burden MD Provider:  Josafat Pineda Jr., MD    Anesthesia Type:  general ASA Status:  4          Anesthesia Type: general    Vitals  Vitals Value Taken Time   BP 96/50    Temp 97.2F    Pulse 70 6/18/2020 10:07 AM   Resp 14    SpO2 96 % 6/18/2020 10:07 AM   Vitals shown include unvalidated device data.        Post Anesthesia Care and Evaluation    Patient location during evaluation: ICU  Patient participation: waiting for patient participation  Post-procedure mental status: Intubated and sedated.  Pain score: 0  Pain management: adequate  Airway patency: patent  Anesthetic complications: No anesthetic complications  PONV Status: none  Cardiovascular status: acceptable and stable  Respiratory status: acceptable, ETT, intubated and ventilator  Hydration status: acceptable    Comments: Pt transported to ICU with transport monitor and 100% O2 by ambu bag. Report given to RN at the bedside.

## 2020-06-18 NOTE — PROGRESS NOTES
Critical Care Note     LOS: 1 day   Patient Care Team:  Provider, No Known as PCP - General    Chief Complaint/Reason for visit:      Severe mitral regurgitation, mitral valve replacement June 18, 2020  Coronary artery disease with previous stent  Hypertension  Postop ventilator management      Subjective   Rakan Boone is a 59 y.o. male who presents to the ICU 06/18/20 following an elective MVR per Dr. Burden.    The patient has a PMH significant for HTN, dyslipidemia, chewing tobacco use, and CAD s/p stent (2017). Per previous note, the patient has been experiencing intermittent chest pressure with associated shortness of breath for about 2 years. His symptoms became so significant that he went through short and ultimately long-term disability, and never returned to work. He recently worsened to developing severe shortness of breath with very minimal activity, and presented to Dr. Goldstein for evaluation.    Cardiology workup included outpatient TTE and carotid duplex (results currently unavailable) as well as C on 6/16 which demonstrated patent LAD stent, normal LV systolic function, and severe mitral valve regurgitation. Following cardiac catheterization he was admitted to [facility] and CTS was consulted for possible MVR. Mr. Boone was evaluated by Dr. Burden who deemed him a surgical candidate, and today he underwent an elective mitral valve repair. He was transferred to the ICU for postoperative management.   Interval History:     History taken from: patient    Review of Systems:    All systems were reviewed and negative except as noted in subjective.    Medical history, surgical history, social history, family history reviewed    Objective     Intake/Output:    Intake/Output Summary (Last 24 hours) at 6/18/2020 1120  Last data filed at 6/18/2020 1002  Gross per 24 hour   Intake 2713 ml   Output 2565 ml   Net 148 ml       Nutrition:  NPO Diet    Infusions:    dexmedetomidine 0.2-1.5 mcg/kg/hr    dextrose  "5 % with KCl 20 mEq 30 mL/hr Last Rate: 30 mL/hr (06/18/20 1024)   DOBUTamine 2-20 mcg/kg/min    DOPamine 2-20 mcg/kg/min    EPINEPHrine 0.02-0.3 mcg/kg/min    insulin 0-50 Units/hr    lactated ringers 9 mL/hr Last Rate: 9 mL/hr (06/18/20 0638)   niCARdipine 5-15 mg/hr    nitroglycerin 5-200 mcg/min    norepinephrine 0.02-0.3 mcg/kg/min    phenylephrine 0.5-3 mcg/kg/min    propofol 5-50 mcg/kg/min Last Rate: 25 mcg/kg/min (06/18/20 1002)   sodium chloride 30 mL/hr    vasopressin 0.02-0.1 Units/min        Mechanical Ventilator Settings:         Vt (Set, L): 0.6 L  Resp Rate (Set): 16  Pressure Support (cm H2O): 10 cm H20  FiO2 (%): 100 %  PEEP/CPAP (cm H2O): 5 cm H20    Minute Ventilation (L/min) (Obs): 10.5 L/min  Resp Rate (Observed) Vent: 16  I:E Ratio (Set): 1:2.61  I:E Ratio (Obs): 1:2.60    PIP Observed (cm H2O): 24 cm H2O  Plateau Pressure (cm H2O): 0 cm H2O    Telemetry: Sinus rhythm             Vital Signs  Blood pressure 93/46, pulse 66, temperature 95.3 °F (35.2 °C), temperature source Core, resp. rate 16, height 170.2 cm (67\"), weight 84.5 kg (186 lb 3.2 oz), SpO2 97 %.    Physical Exam:  General Appearance:   Middle-aged white male, sedated   Head:   Atraumatic   Eyes:          Pupils small, equal, conjunctiva pink   Ears:     Throat:  Orally intubated   Neck:  Right IJ [place]   Back:      Lungs:    Sternotomy incision intact.  EMTs with serosanguineous drainage.  Decreased breath sounds left chest.    Heart:   Regular, S1, S2 auscultated   Abdomen:    Hypoactive bowel sounds, nondistended, soft   Rectal:   Deferred   Extremities:  No pitting edema or cyanosis, right radial arterial line   Pulses:  Pedal pulses present   Skin:  Warm and dry   Lymph nodes:    Neurologic:  Sedated      Results Review:     I reviewed the patient's new clinical results.   Results from last 7 days   Lab Units 06/18/20  1016 06/17/20  0458 06/16/20  1136   SODIUM mmol/L 138 139 139   POTASSIUM mmol/L 4.7 3.9 3.7   CHLORIDE " mmol/L 102 105 103   CO2 mmol/L 25.0 24.0 25.0   BUN mg/dL 14 21 18   CREATININE mg/dL 0.95 0.83 0.97   CALCIUM mg/dL 9.7 8.6 9.2   BILIRUBIN mg/dL  --  1.3*  --    ALK PHOS U/L  --  72  --    ALT (SGPT) U/L  --  7  --    AST (SGOT) U/L  --  11  --    GLUCOSE mg/dL 123* 101* 97     Results from last 7 days   Lab Units 06/18/20  1016 06/18/20  0916 06/18/20  0846  06/17/20  0458 06/16/20  1136   WBC 10*3/mm3 5.30  --   --   --  5.36 6.30   HEMOGLOBIN g/dL 9.8*  --   --   --  11.3* 12.9*   HEMOGLOBIN, POC g/dL  --  9.5* 10.5*   < >  --   --    HEMATOCRIT % 31.4*  --   --   --  37.7 42.1   HEMATOCRIT POC %  --  28* 31*   < >  --   --    PLATELETS 10*3/mm3 152  --   --   --  157 196    < > = values in this interval not displayed.     Results from last 7 days   Lab Units 06/18/20  1022   PH, ARTERIAL pH units 7.492*   PO2 ART mm Hg 82.8*   PCO2, ARTERIAL mm Hg 33.9*   HCO3 ART mmol/L 26.0     No results found for: BLOODCX  No results found for: URINECX    I reviewed the patient's new imaging including images and reports.  Chest x-ray reveals the endotracheal tube in the right mainstem.  [place] is folded back on itself in the pulmonary artery    All medications reviewed.     amLODIPine 5 mg Oral Daily   aspirin 325 mg Oral Daily   aspirin 325 mg Oral Once   atorvastatin 40 mg Oral Daily   cefuroxime 1.5 g Intravenous Q8H   chlorhexidine 15 mL Mouth/Throat Q12H   furosemide 20 mg Oral Daily   isosorbide mononitrate 30 mg Oral Daily   metoprolol succinate  mg Oral Daily   metoprolol tartrate 2.5 mg Intravenous Q6H   norepinephrine (LEVOPHED) 32 mcg/mL (8 mg/250 mL) infusion 0.02-0.3 mcg/kg/min Intravenous Once   potassium chloride 20 mEq Oral BID With Meals   sertraline 100 mg Oral Daily   sodium chloride 10 mL Intravenous Q12H   tamsulosin 0.4 mg Oral Daily         Assessment/Plan       Severe mitral valve regurgitation    Hypertension    Dyslipidemia    BPH     Anxiety and depression    Chewing tobacco use      #1  severe mitral valve regurgitation status post mitral valve replacement.  He received 2 sixpacks of platelets and surgery.  Chest tube output 115 mL since surgery.  Postoperative hemoglobin is 9.8.  He is in sinus rhythm.  He has a right mainstem intubation.  Endotracheal tube withdrawn 3 cm and repeat x-ray reveals endotracheal tube is now 1 cm above the rodney.  [place] pulled back to 49 cm and is still folded in the pulmonary artery.  CT surgery removed it.  He is in sinus rhythm.    #2 hypertension, currently systolic blood pressure in the 90s, sedated.  He takes Norvasc, metoprolol, Flomax at home    #3 dyslipidemia on a statin    PLAN:  Pulmonary artery catheter removed because of malposition  Monitor hourly urine output, mediastinal drainage  Monitor electrolytes and replace  Monitor for arrhythmias  Wean FiO2 as tolerated  Insulin drip per protocol, currently blood glucose 123  Aspirin, statin, beta-blocker  Anticipate wean and extubation per protocol    VTE Prophylaxis: Foot pumps    Stress Ulcer Prophylaxis: None    Josie Beltran MD  06/18/20  11:20      Time: Critical care 25 min  I personally provided care to this critically ill patient as documented above.  Critical care time does not include time spent on separately billed procedures.  Non of my critical care time was concurrent with other critical care providers.

## 2020-06-18 NOTE — PLAN OF CARE
Problem: Patient Care Overview  Goal: Plan of Care Review  Outcome: Ongoing (interventions implemented as appropriate)  Flowsheets (Taken 6/18/2020 0433)  Progress: no change  Outcome Summary: VSS. Normal sinus to sinus bradycardia on the monitor. 2L nasal cannula worn while sleeping, RA while awake. No complaints of pain noted. Patient NPO since midnight for mitral valve repair/replacement with transesophageal echocardiogram. Will continue to monitor.

## 2020-06-18 NOTE — RESEARCH
RISK SCORES Procedure: Isolated MVR CALCULATE   Risk of Mortality:  1.615%    Renal Failure:  1.869%    Permanent Stroke:  1.447%    Prolonged Ventilation:  7.329%    DSW Infection:  0.162%    Reoperation:  4.816%    Morbidity or Mortality:  11.694%    Short Length of Stay:  32.359%    Long Length of Stay:  6.498%

## 2020-06-18 NOTE — ANESTHESIA PROCEDURE NOTES
Arterial Line      Patient reassessed immediately prior to procedure    Patient location during procedure: pre-op  Start time: 6/18/2020 6:30 AM  Stop Time:6/18/2020 6:40 AM       Line placed for hemodynamic monitoring.  Performed By   Anesthesiologist: Josafat Pineda Jr., MD  Preanesthetic Checklist  Completed: patient identified, site marked, surgical consent, pre-op evaluation, timeout performed, IV checked, risks and benefits discussed and monitors and equipment checked  Arterial Line Prep   Sterile Tech: cap, gloves and sterile barriers  Prep: ChloraPrep  Patient monitoring: blood pressure monitoring, continuous pulse oximetry and EKG  Arterial Line Procedure   Laterality:right  Location:  radial artery  Catheter size: 20 G   Guidance: palpation technique  Number of attempts: 1  Successful placement: yes  Post Assessment   Dressing Type: line sutured, occlusive dressing applied, secured with tape, wrist guard applied and biopatch applied.   Complications no  Circ/Move/Sens Assessment: normal and unchanged.   Patient Tolerance: patient tolerated the procedure well with no apparent complications

## 2020-06-18 NOTE — PROGRESS NOTES
[facility]       LOS: 1 day   Patient Care Team:  Provider, No Known as PCP - General        Subjective       Patient Denies:  Intubated      Vital Signs  Temp:  [95.3 °F (35.2 °C)-98.5 °F (36.9 °C)] 95.3 °F (35.2 °C)  Heart Rate:  [59-73] 66  Resp:  [16-18] 16  BP: ()/(46-82) 93/46  Arterial Line BP: (90-94)/(43-47) 94/47  FiO2 (%):  [100 %] 100 %    Intake/Output Summary (Last 24 hours) at 6/18/2020 1128  Last data filed at 6/18/2020 1002  Gross per 24 hour   Intake 2713 ml   Output 2565 ml   Net 148 ml     I/O this shift:  In: 2713 [I.V.:1300; Blood:1413]  Out: 1440 [Urine:1325; Chest Tube:115]    Physical Exam:     General Appearance:    Sedated, in no acute distress       Neck:   No adenopathy, supple, trachea midline, no thyromegaly, no JVD   Lungs:     Clear to auscultation anteriorly,respirations regular, even and                unlabored    Heart:    Regular rhythm and normal rate, normal S1 and S2, no         murmur, no gallop, no rub, no click   Chest Wall:    No abnormalities observed   Abdomen:     Normal bowel sounds, no masses, no organomegaly, soft        Extremities:   No edema, no cyanosis, no redness   Pulses:   Pulses palpable and equal bilaterally     Results Review:     I reviewed the patient's new clinical results.      WBC WBC   Date/Time Value Ref Range Status   06/18/2020 1016 5.30 3.40 - 10.80 10*3/mm3 Final   06/17/2020 0458 5.36 3.40 - 10.80 10*3/mm3 Final   06/16/2020 1136 6.30 3.40 - 10.80 10*3/mm3 Final            HGB Hemoglobin   Date/Time Value Ref Range Status   06/18/2020 1016 9.8 (L) 13.0 - 17.7 g/dL Final   06/18/2020 0916 9.5 (L) 12.0 - 17.0 g/dL Final   06/18/2020 0846 10.5 (L) 12.0 - 17.0 g/dL Final   06/18/2020 0821 8.8 (L) 12.0 - 17.0 g/dL Final   06/18/2020 0815 8.2 (L) 12.0 - 17.0 g/dL Final   06/18/2020 0755 10.2 (L) 12.0 - 17.0 g/dL Final   06/18/2020 0703 11.9 (L) 12.0 - 17.0 g/dL Final   06/17/2020  0458 11.3 (L) 13.0 - 17.7 g/dL Final   06/16/2020 1136 12.9 (L) 13.0 - 17.7 g/dL Final           HCT Hematocrit   Date/Time Value Ref Range Status   06/18/2020 1016 31.4 (L) 37.5 - 51.0 % Final   06/18/2020 0916 28 (L) 38 - 51 % Final   06/18/2020 0846 31 (L) 38 - 51 % Final   06/18/2020 0821 26 (L) 38 - 51 % Final   06/18/2020 0815 24 (L) 38 - 51 % Final   06/18/2020 0755 30 (L) 38 - 51 % Final   06/18/2020 0703 35 (L) 38 - 51 % Final   06/17/2020 0458 37.7 37.5 - 51.0 % Final   06/16/2020 1136 42.1 37.5 - 51.0 % Final            Platlets Platelets   Date/Time Value Ref Range Status   06/18/2020 1016 152 140 - 450 10*3/mm3 Final   06/17/2020 0458 157 140 - 450 10*3/mm3 Final   06/16/2020 1136 196 140 - 450 10*3/mm3 Final     Sodium  Sodium   Date/Time Value Ref Range Status   06/18/2020 1016 138 136 - 145 mmol/L Final   06/17/2020 0458 139 136 - 145 mmol/L Final   06/16/2020 1136 139 136 - 145 mmol/L Final     Potassium  Potassium   Date/Time Value Ref Range Status   06/18/2020 1016 4.7 3.5 - 5.2 mmol/L Final   06/17/2020 0458 3.9 3.5 - 5.2 mmol/L Final   06/16/2020 1136 3.7 3.5 - 5.2 mmol/L Final     Chloride  Chloride   Date/Time Value Ref Range Status   06/18/2020 1016 102 98 - 107 mmol/L Final   06/17/2020 0458 105 98 - 107 mmol/L Final   06/16/2020 1136 103 98 - 107 mmol/L Final     BicarbonateNo results found for: PLASMABICARB    BUN BUN   Date/Time Value Ref Range Status   06/18/2020 1016 14 8 - 23 mg/dL Final   06/17/2020 0458 21 8 - 23 mg/dL Final   06/16/2020 1136 18 8 - 23 mg/dL Final      Creatinine Creatinine   Date/Time Value Ref Range Status   06/18/2020 1016 0.95 0.76 - 1.27 mg/dL Final   06/17/2020 0458 0.83 0.76 - 1.27 mg/dL Final   06/16/2020 1136 0.97 0.76 - 1.27 mg/dL Final      Calcium Calcium   Date/Time Value Ref Range Status   06/18/2020 1016 9.7 8.6 - 10.5 mg/dL Final   06/17/2020 0458 8.6 8.6 - 10.5 mg/dL Final   06/16/2020 1136 9.2 8.6 - 10.5 mg/dL Final      Mag @RESULFAST(MG:3)@         PT/INR:       Lab Results   Component Value Date    PROTIME 16.9 (H) 06/18/2020    PROTIME 14.2 (H) 06/16/2020    INR 1.41 (H) 06/18/2020    INR 1.13 06/16/2020      Troponin I:  No results found for: TROPONINI No results found for: CKTOTAL, CKMB, CKMBINDEX, POCRTROPI, TROPONINT      amLODIPine 5 mg Oral Daily   aspirin 325 mg Oral Daily   aspirin 325 mg Oral Once   atorvastatin 40 mg Oral Daily   cefuroxime 1.5 g Intravenous Q8H   chlorhexidine 15 mL Mouth/Throat Q12H   [START ON 6/19/2020] metoprolol tartrate 12.5 mg Oral Q12H   metoprolol tartrate 2.5 mg Intravenous Q6H   sertraline 100 mg Oral Daily   sodium chloride 10 mL Intravenous Q12H   tamsulosin 0.4 mg Oral Daily       dexmedetomidine 0.2-1.5 mcg/kg/hr    dextrose 5 % with KCl 20 mEq 30 mL/hr    DOBUTamine 2-20 mcg/kg/min    DOPamine 2-20 mcg/kg/min    EPINEPHrine 0.02-0.3 mcg/kg/min    insulin 0-50 Units/hr    niCARdipine 5-15 mg/hr    nitroglycerin 5-200 mcg/min    norepinephrine 0.02-0.3 mcg/kg/min    phenylephrine 0.5-3 mcg/kg/min    propofol 5-50 mcg/kg/min Last Rate: 25 mcg/kg/min (06/18/20 1002)   sodium chloride 30 mL/hr    vasopressin 0.02-0.1 Units/min        Assessment/Plan     Patient Active Problem List   Diagnosis Code   • Severe mitral valve regurgitation I34.0   • Hypertension I10   • Dyslipidemia E78.5   • BPH  N40.0   • Anxiety and depression F41.9, F32.9   • Chewing tobacco use Z72.0     CV stable early post tissue MVR and left atrial appendage ligation  Normal EF    MD Miquel Cadet PA  06/18/20  11:28

## 2020-06-18 NOTE — ANESTHESIA PROCEDURE NOTES
Airway  Urgency: elective    Date/Time: 6/18/2020 7:06 AM  Airway not difficult    General Information and Staff    Patient location during procedure: OR  Anesthesiologist: Josafat Pineda Jr., MD    Indications and Patient Condition  Indications for airway management: airway protection    Preoxygenated: yes  MILS not maintained throughout  Mask difficulty assessment: 0 - not attempted    Final Airway Details  Final airway type: endotracheal airway      Successful airway: ETT  Cuffed: yes   Successful intubation technique: direct laryngoscopy  Facilitating devices/methods: intubating stylet  Endotracheal tube insertion site: oral  Blade: Vandana  Blade size: 3  ETT size (mm): 8.0  Cormack-Lehane Classification: grade I - full view of glottis  Placement verified by: chest auscultation and capnometry   Measured from: lips  ETT/EBT  to lips (cm): 23  Number of attempts at approach: 1  Assessment: lips, teeth, and gum same as pre-op and atraumatic intubation    Additional Comments  Negative epigastric sounds, Breath sound equal bilaterally with symmetric chest rise and fall

## 2020-06-18 NOTE — ANESTHESIA PREPROCEDURE EVALUATION
Anesthesia Evaluation     Patient summary reviewed and Nursing notes reviewed   no history of anesthetic complications:  NPO Solid Status: > 8 hours  NPO Liquid Status: > 2 hours           Airway   Mallampati: II  TM distance: >3 FB  Neck ROM: full  No difficulty expected  Dental - normal exam     Pulmonary - normal exam   (+) a smoker Former,   (-) COPD, asthma  Cardiovascular - normal exam  Exercise tolerance: poor (<4 METS)    ECG reviewed  Patient on routine beta blocker and Beta blocker given within 24 hours of surgery    (+) hypertension, valvular problems/murmurs MR, cardiac stents Drug eluting stent more than 12 months ago BARNES, hyperlipidemia,   (-) angina, CHF    ROS comment: [facility] without disease and patent prior stent.     Neuro/Psych- negative ROS  (-) seizures, CVA  GI/Hepatic/Renal/Endo    (-) liver disease, no renal disease, diabetes    ROS Comment: Denies h/o esophageal problems. No history of esophageal procedures. Denies difficulty swallowing or food impactions.     Musculoskeletal     Abdominal    Substance History - negative use     OB/GYN          Other - negative ROS                       Anesthesia Plan    ASA 4     general   (Kimberly, CVL with swan palmer, REMBERTO)  intravenous induction   Postoperative Plan: Expected vent after surgery  Anesthetic plan, all risks, benefits, and alternatives have been provided, discussed and informed consent has been obtained with: patient.

## 2020-06-18 NOTE — PLAN OF CARE
Problem: Patient Care Overview  Goal: Plan of Care Review  Outcome: Ongoing (interventions implemented as appropriate)  Flowsheets (Taken 6/18/2020 1814)  Progress: improving  Plan of Care Reviewed With: patient  Outcome Summary: Out of surgery at 0955. Post extubation, alert and oriented, but agitated and uncoperative. Extubated to 4LNC at 1549. Minimal chest tube drainage.Remained in normal sinus. Justo pulled today due to poor positioning. Femoral line d/c'd without complication. Remains normotensive. Running low grade temp, Loratab given for pain. Abdomen rigid with no bowel sounds. Insulin drip initiated. Aguila catheter in place with adequate UOP. Pain controlled via IV and PO meds.

## 2020-06-18 NOTE — ANESTHESIA PROCEDURE NOTES
Procedure Performed: Emergent/Open-Heart Anesthesia REMBERTO      Start Time:  6/18/2020 7:20 AM       End Time:      Preanesthesia Checklist:  Patient identified, IV assessed, risks and benefits discussed, monitors and equipment assessed, procedure being performed at surgeon's request and anesthesia consent obtained.    General Procedure Information  Diagnostic Indications for Echo:  assessment of surgical repair  Physician Requesting Echo: Cooper Burden MD  Location performed:  OR  Intubated  Bite block not placed  Heart visualized  Probe Insertion:  Easy  Probe Type:  Multiplane  Modalities:  2D only, color flow mapping, continuous wave Doppler and pulse wave Doppler    Echocardiographic and Doppler Measurements    Ventricles    Right Ventricle:  Cavity size normal.  Hypertrophy not present.  Thrombus not present.  Global function normal.  Ejection Fraction 65%.    Left Ventricle:  Cavity size normal.  Hypertrophy not present.  Thrombus not present.  Global Function normal.      Ventricular Regional Function:  1- Basal Anteroseptal:  normal  2- Basal Anterior:  normal  3- Basal Anterolateral:  normal  4- Basal Inferolateral:  normal  5- Basal Inferior:  normal  6- Basal Inferoseptal:  normal  7- Mid Anteroseptal:  normal  8- Mid Anterior:  normal  9- Mid Anterolateral:  normal  10- Mid Inferolateral:  normal  11- Mid Inferior:  normal  12- Mid Inferoseptal:  normal  13- Apical Anterior:  normal  14- Apical Lateral:  normal  15- Apical Inferior:  normal  16- Apical Septal:  normal  17- [place]:  normal      Valves    Aortic Valve:  Annulus normal.  Stenosis not present.  Regurgitation trace.  Leaflets normal.  Leaflet motions normal.      Mitral Valve:  Annulus normal.  Stenosis not present.  Regurgitation severe.  Leaflets normal.  Leaflet motions flail.  Specific leaflet segments with abnormal motions are described in the following comments:       p2    Tricuspid Valve:  Annulus normal.  Stenosis not present.   Regurgitation trace.  Leaflets normal.  Leaflet motions normal.    Pulmonic Valve:  Annulus normal.  Stenosis not present.  Regurgitation trace.          Aorta    Ascending Aorta:  Size normal.  Dissection not present.  Plaque thickness less than 3 mm.  Mobile plaque not present.    Aortic Arch:  Size normal.  Dissection not present.  Plaque thickness less than 3 mm.  Mobile plaque not present.    Descending Aorta:  Size normal.  Dissection not present.  Plaque thickness less than 3 mm.  Mobile plaque not present.          Atria    Right Atrium:  Size normal.  Spontaneous echo contrast not present.  Thrombus not present.  Tumor not present.    Left Atrium:  Size dilated.  Spontaneous echo contrast not present.  Thrombus not present.  Tumor not present.  Left atrial appendage normal.      Septa    Atrial Septum:  Intra-atrial septal morphology normal.      Ventricular Septum:  Intra-ventricular septum morphology normal.          Other Findings  Pericardium:  normal  Pleural Effusion:  none      Anesthesia Information  Performed Personally      Echocardiogram Comments:       Diagnostic intraoperative REMBERTO performed for mitral valve replacement by Dr. Burden.    Baseline Exam:  - LV is normal in size and systolic function without regional wall motion abnormalities. LVEF is >60%  - RV is normal in size with preserved systolic function.   - The MV is morphologically normal but there is flail P2 segment causing severe MR. The large regurgitant jet is anteriorly directed and exhibits coanda effect. The VC is 0.6cm, EROA by PISA is 1.33cm^2 and regurgitant volume of 197mL.   - There is trace TR and PI with PA catheter in place.  - No other significant valvular abnormalities.  - No significant pathology in the visualized portions of the aorta.     Post-procedure Exam:  - Interval mitral valve replacement with 29mm Epic bioprosthetic valve.  - Biventricular function is stable post CPB on no inotropic support.  - There is a  bioprosthetic valve in the native mitral location. It appears well seated and leaflets exhibit adequate excursion. There are no paravalvular leaks. The mean PG is 4mmHg.   - No other new valvular abnormalities.  - No evidence of dissection in the visualized portions of the aorta.

## 2020-06-18 NOTE — ANESTHESIA PROCEDURE NOTES
Central Line      Patient reassessed immediately prior to procedure    Patient location during procedure: OR  Start time: 6/18/2020 7:09 AM  Stop Time:6/18/2020 7:15 AM  Indications: vascular access  Staff  Anesthesiologist: Josafat Pineda Jr., MD  Preanesthetic Checklist  Completed: patient identified, site marked, surgical consent, pre-op evaluation, timeout performed, IV checked, risks and benefits discussed and monitors and equipment checked  Central Line Prep  Sterile Tech:cap, gloves, gown, mask and sterile barriers  Prep: chloraprep  Patient monitoring: blood pressure monitoring, continuous pulse oximetry and EKG  Central Line Procedure  Laterality:right  Location:internal jugular  Catheter Type:double lumen  Catheter Size:9 Fr  Guidance:ultrasound guided  PROCEDURE NOTE/ULTRASOUND INTERPRETATION.  Using ultrasound guidance the potential vascular sites for insertion of the catheter were visualized to determine the patency of the vessel to be used for vascular access.  After selecting the appropriate site for insertion, the needle was visualized under ultrasound being inserted into the internal jugular vein, followed by ultrasound confirmation of wire and catheter placement. There were no abnormalities seen on ultrasound; an image was taken; and the patient tolerated the procedure with no complications. Images: still images obtained, printed/placed on chart  Assessment  Post procedure:biopatch applied, line sutured, occlusive dressing applied and secured with tape  Assessement:blood return through all ports, free fluid flow and chest x-ray ordered  Complications:no  Patient Tolerance:patient tolerated the procedure well with no apparent complications

## 2020-06-18 NOTE — PROGRESS NOTES
CTS Progress Note       LOS: 1 day   Patient Care Team:  Provider, No Known as PCP - General    Chief Complaint: Abnormal stress test    Vital Signs:  Temp:  [97.8 °F (36.6 °C)-98.5 °F (36.9 °C)] 97.8 °F (36.6 °C)  Heart Rate:  [59-73] 63  Resp:  [16-18] 18  BP: (101-124)/(70-82) 101/75    Physical Exam:  Alert conversant breathing unlabored     Results:   Results from last 7 days   Lab Units 06/17/20  0458   WBC 10*3/mm3 5.36   HEMOGLOBIN g/dL 11.3*   HEMATOCRIT % 37.7   PLATELETS 10*3/mm3 157     Results from last 7 days   Lab Units 06/17/20  0458   SODIUM mmol/L 139   POTASSIUM mmol/L 3.9   CHLORIDE mmol/L 105   CO2 mmol/L 24.0   BUN mg/dL 21   CREATININE mg/dL 0.83   GLUCOSE mg/dL 101*   CALCIUM mg/dL 8.6           Imaging Results (Last 24 Hours)     Procedure Component Value Units Date/Time    XR Chest 1 View [[id number]] Collected:  06/17/20 1128     Updated:  06/17/20 1602    Narrative:          EXAMINATION: XR CHEST 1 VW-06/16/2020:      INDICATION: Preop.      COMPARISON: NONE.     FINDINGS: Portable chest reveals cardiac and mediastinal silhouettes  within normal limits. The lung fields are grossly clear. No focal  parenchymal opacification present. No pleural effusion or pneumothorax.  The bony structures are unremarkable. Pulmonary vascularity is within  normal limits.           Impression:       No acute cardiopulmonary disease.     D:  06/17/2020  E:  06/17/2020     This report was finalized on 6/17/2020 3:59 PM by Dr. Brittanie Mistry MD.             Assessment      Abnormal stress test    Mitral valve insufficiency    I am seeing the patient this morning in the preoperative area.  This is the third time we have discussed his surgery.  I once again indicated that there is a risk of stroke bleeding infection death and he agrees to proceed.  We have discussed valve choices and options now for the third occasion he is insistent on the bioprosthetic valve and Dr. Goldstein not discussed that also will  proceed with bioprosthetic valve.    Plan   As above    Please note that portions of this note were completed with a voice recognition program. Efforts were made to edit the dictations, but occasionally words are mistranscribed.    Cooper Burden MD  06/18/20  07:08

## 2020-06-18 NOTE — NURSING NOTE
First spontaneous trial ABGs drawn. Not within limits to extubate. Notified Dr. Beltran. Order to put back on rate and will try to wean in 30 minutes.

## 2020-06-19 ENCOUNTER — APPOINTMENT (OUTPATIENT)
Dept: GENERAL RADIOLOGY | Facility: HOSPITAL | Age: 60
End: 2020-06-19

## 2020-06-19 LAB
ALBUMIN SERPL-MCNC: 4.6 G/DL (ref 3.5–5.2)
ANION GAP SERPL CALCULATED.3IONS-SCNC: 12 MMOL/L (ref 5–15)
BASOPHILS # BLD AUTO: 0.02 10*3/MM3 (ref 0–0.2)
BASOPHILS NFR BLD AUTO: 0.2 % (ref 0–1.5)
BH BB BLOOD EXPIRATION DATE: NORMAL
BH BB BLOOD TYPE BARCODE: 5100
BH BB BLOOD TYPE BARCODE: 5100
BH BB BLOOD TYPE BARCODE: 6200
BH BB BLOOD TYPE BARCODE: 6200
BH BB DISPENSE STATUS: NORMAL
BH BB PRODUCT CODE: NORMAL
BH BB UNIT NUMBER: NORMAL
BUN BLD-MCNC: 18 MG/DL (ref 8–23)
BUN/CREAT SERPL: 16.1 (ref 7–25)
CALCIUM SPEC-SCNC: 8.7 MG/DL (ref 8.6–10.5)
CHLORIDE SERPL-SCNC: 95 MMOL/L (ref 98–107)
CO2 SERPL-SCNC: 24 MMOL/L (ref 22–29)
CREAT BLD-MCNC: 1.12 MG/DL (ref 0.76–1.27)
CROSSMATCH INTERPRETATION: NORMAL
CROSSMATCH INTERPRETATION: NORMAL
CYTO UR: NORMAL
DEPRECATED RDW RBC AUTO: 46.2 FL (ref 37–54)
ELLIPTOCYTES BLD QL SMEAR: NORMAL
EOSINOPHIL # BLD AUTO: 0.15 10*3/MM3 (ref 0–0.4)
EOSINOPHIL NFR BLD AUTO: 1.6 % (ref 0.3–6.2)
ERYTHROCYTE [DISTWIDTH] IN BLOOD BY AUTOMATED COUNT: 14.7 % (ref 12.3–15.4)
GFR SERPL CREATININE-BSD FRML MDRD: 67 ML/MIN/1.73
GLUCOSE BLD-MCNC: 126 MG/DL (ref 65–99)
GLUCOSE BLDC GLUCOMTR-MCNC: 114 MG/DL (ref 70–130)
GLUCOSE BLDC GLUCOMTR-MCNC: 114 MG/DL (ref 70–130)
GLUCOSE BLDC GLUCOMTR-MCNC: 117 MG/DL (ref 70–130)
GLUCOSE BLDC GLUCOMTR-MCNC: 119 MG/DL (ref 70–130)
GLUCOSE BLDC GLUCOMTR-MCNC: 121 MG/DL (ref 70–130)
GLUCOSE BLDC GLUCOMTR-MCNC: 122 MG/DL (ref 70–130)
GLUCOSE BLDC GLUCOMTR-MCNC: 123 MG/DL (ref 70–130)
GLUCOSE BLDC GLUCOMTR-MCNC: 138 MG/DL (ref 70–130)
GLUCOSE BLDC GLUCOMTR-MCNC: 143 MG/DL (ref 70–130)
HCT VFR BLD AUTO: 29.9 % (ref 37.5–51)
HGB BLD-MCNC: 9 G/DL (ref 13–17.7)
IMM GRANULOCYTES # BLD AUTO: 0.03 10*3/MM3 (ref 0–0.05)
IMM GRANULOCYTES NFR BLD AUTO: 0.3 % (ref 0–0.5)
INR PPP: 1.3 (ref 0.85–1.16)
LAB AP CASE REPORT: NORMAL
LAB AP CLINICAL INFORMATION: NORMAL
LYMPHOCYTES # BLD AUTO: 0.38 10*3/MM3 (ref 0.7–3.1)
LYMPHOCYTES NFR BLD AUTO: 4.1 % (ref 19.6–45.3)
MAGNESIUM SERPL-MCNC: 2.5 MG/DL (ref 1.6–2.4)
MCH RBC QN AUTO: 25.9 PG (ref 26.6–33)
MCHC RBC AUTO-ENTMCNC: 30.1 G/DL (ref 31.5–35.7)
MCV RBC AUTO: 85.9 FL (ref 79–97)
MONOCYTES # BLD AUTO: 0.99 10*3/MM3 (ref 0.1–0.9)
MONOCYTES NFR BLD AUTO: 10.6 % (ref 5–12)
NEUTROPHILS # BLD AUTO: 7.8 10*3/MM3 (ref 1.7–7)
NEUTROPHILS NFR BLD AUTO: 83.2 % (ref 42.7–76)
NRBC BLD AUTO-RTO: 0 /100 WBC (ref 0–0.2)
PATH REPORT.FINAL DX SPEC: NORMAL
PATH REPORT.GROSS SPEC: NORMAL
PHOSPHATE SERPL-MCNC: 4.9 MG/DL (ref 2.5–4.5)
PLAT MORPH BLD: NORMAL
PLATELET # BLD AUTO: 140 10*3/MM3 (ref 140–450)
PMV BLD AUTO: 12 FL (ref 6–12)
POTASSIUM BLD-SCNC: 4.5 MMOL/L (ref 3.5–5.2)
PROTHROMBIN TIME: 15.9 SECONDS (ref 11.5–14)
RBC # BLD AUTO: 3.48 10*6/MM3 (ref 4.14–5.8)
SODIUM BLD-SCNC: 131 MMOL/L (ref 136–145)
UNIT  ABO: NORMAL
UNIT  RH: NORMAL
WBC MORPH BLD: NORMAL
WBC NRBC COR # BLD: 9.37 10*3/MM3 (ref 3.4–10.8)

## 2020-06-19 PROCEDURE — 25010000003 CEFUROXIME SODIUM 1.5 G RECONSTITUTED SOLUTION: Performed by: PHYSICIAN ASSISTANT

## 2020-06-19 PROCEDURE — 93005 ELECTROCARDIOGRAM TRACING: CPT | Performed by: PHYSICIAN ASSISTANT

## 2020-06-19 PROCEDURE — 80069 RENAL FUNCTION PANEL: CPT | Performed by: PHYSICIAN ASSISTANT

## 2020-06-19 PROCEDURE — 85610 PROTHROMBIN TIME: CPT | Performed by: PHYSICIAN ASSISTANT

## 2020-06-19 PROCEDURE — 85007 BL SMEAR W/DIFF WBC COUNT: CPT | Performed by: PHYSICIAN ASSISTANT

## 2020-06-19 PROCEDURE — 94799 UNLISTED PULMONARY SVC/PX: CPT

## 2020-06-19 PROCEDURE — 83735 ASSAY OF MAGNESIUM: CPT | Performed by: PHYSICIAN ASSISTANT

## 2020-06-19 PROCEDURE — 97162 PT EVAL MOD COMPLEX 30 MIN: CPT

## 2020-06-19 PROCEDURE — 93010 ELECTROCARDIOGRAM REPORT: CPT | Performed by: INTERNAL MEDICINE

## 2020-06-19 PROCEDURE — 71045 X-RAY EXAM CHEST 1 VIEW: CPT

## 2020-06-19 PROCEDURE — 25010000002 KETOROLAC TROMETHAMINE PER 15 MG: Performed by: INTERNAL MEDICINE

## 2020-06-19 PROCEDURE — 25010000002 MORPHINE PER 10 MG: Performed by: THORACIC SURGERY (CARDIOTHORACIC VASCULAR SURGERY)

## 2020-06-19 PROCEDURE — 99233 SBSQ HOSP IP/OBS HIGH 50: CPT | Performed by: INTERNAL MEDICINE

## 2020-06-19 PROCEDURE — P9041 ALBUMIN (HUMAN),5%, 50ML: HCPCS | Performed by: INTERNAL MEDICINE

## 2020-06-19 PROCEDURE — 85025 COMPLETE CBC W/AUTO DIFF WBC: CPT | Performed by: PHYSICIAN ASSISTANT

## 2020-06-19 PROCEDURE — 99024 POSTOP FOLLOW-UP VISIT: CPT | Performed by: THORACIC SURGERY (CARDIOTHORACIC VASCULAR SURGERY)

## 2020-06-19 PROCEDURE — 82962 GLUCOSE BLOOD TEST: CPT

## 2020-06-19 PROCEDURE — 25010000002 ONDANSETRON PER 1 MG: Performed by: PHYSICIAN ASSISTANT

## 2020-06-19 PROCEDURE — 94640 AIRWAY INHALATION TREATMENT: CPT

## 2020-06-19 PROCEDURE — 25010000002 ALBUMIN HUMAN 5% PER 50 ML: Performed by: INTERNAL MEDICINE

## 2020-06-19 RX ORDER — MORPHINE SULFATE 2 MG/ML
2 INJECTION, SOLUTION INTRAMUSCULAR; INTRAVENOUS
Status: DISCONTINUED | OUTPATIENT
Start: 2020-06-19 | End: 2020-06-23 | Stop reason: HOSPADM

## 2020-06-19 RX ORDER — ALBUMIN, HUMAN INJ 5% 5 %
500 SOLUTION INTRAVENOUS ONCE
Status: COMPLETED | OUTPATIENT
Start: 2020-06-19 | End: 2020-06-19

## 2020-06-19 RX ORDER — OXYCODONE HYDROCHLORIDE 5 MG/1
10 TABLET ORAL EVERY 4 HOURS PRN
Status: DISCONTINUED | OUTPATIENT
Start: 2020-06-19 | End: 2020-06-23 | Stop reason: HOSPADM

## 2020-06-19 RX ORDER — KETOROLAC TROMETHAMINE 30 MG/ML
30 INJECTION, SOLUTION INTRAMUSCULAR; INTRAVENOUS EVERY 8 HOURS PRN
Status: DISPENSED | OUTPATIENT
Start: 2020-06-19 | End: 2020-06-21

## 2020-06-19 RX ADMIN — SODIUM CHLORIDE, PRESERVATIVE FREE 10 ML: 5 INJECTION INTRAVENOUS at 08:18

## 2020-06-19 RX ADMIN — MORPHINE SULFATE 2 MG: 2 INJECTION, SOLUTION INTRAMUSCULAR; INTRAVENOUS at 05:05

## 2020-06-19 RX ADMIN — MORPHINE SULFATE 2 MG: 2 INJECTION, SOLUTION INTRAMUSCULAR; INTRAVENOUS at 00:23

## 2020-06-19 RX ADMIN — METOPROLOL TARTRATE 2.5 MG: 5 INJECTION INTRAVENOUS at 05:04

## 2020-06-19 RX ADMIN — ONDANSETRON 4 MG: 2 INJECTION INTRAMUSCULAR; INTRAVENOUS at 01:02

## 2020-06-19 RX ADMIN — OXYCODONE HYDROCHLORIDE AND ACETAMINOPHEN 2 TABLET: 5; 325 TABLET ORAL at 00:23

## 2020-06-19 RX ADMIN — MORPHINE SULFATE 2 MG: 2 INJECTION, SOLUTION INTRAMUSCULAR; INTRAVENOUS at 08:34

## 2020-06-19 RX ADMIN — SENNOSIDES AND DOCUSATE SODIUM 2 TABLET: 8.6; 5 TABLET ORAL at 20:58

## 2020-06-19 RX ADMIN — CHLORHEXIDINE GLUCONATE 0.12% ORAL RINSE 15 ML: 1.2 LIQUID ORAL at 08:14

## 2020-06-19 RX ADMIN — SERTRALINE HYDROCHLORIDE 100 MG: 100 TABLET ORAL at 08:15

## 2020-06-19 RX ADMIN — CEFUROXIME SODIUM 1.5 G: 1.5 INJECTION, POWDER, FOR SOLUTION INTRAVENOUS at 08:14

## 2020-06-19 RX ADMIN — AMLODIPINE BESYLATE 5 MG: 5 TABLET ORAL at 08:14

## 2020-06-19 RX ADMIN — HYDROCODONE BITARTRATE AND ACETAMINOPHEN 1 TABLET: 7.5; 325 TABLET ORAL at 03:37

## 2020-06-19 RX ADMIN — OXYCODONE HYDROCHLORIDE AND ACETAMINOPHEN 2 TABLET: 5; 325 TABLET ORAL at 06:44

## 2020-06-19 RX ADMIN — METOPROLOL TARTRATE 12.5 MG: 25 TABLET, FILM COATED ORAL at 20:58

## 2020-06-19 RX ADMIN — ALBUTEROL SULFATE 2.5 MG: 2.5 SOLUTION RESPIRATORY (INHALATION) at 01:06

## 2020-06-19 RX ADMIN — MORPHINE SULFATE 2 MG: 2 INJECTION, SOLUTION INTRAMUSCULAR; INTRAVENOUS at 03:37

## 2020-06-19 RX ADMIN — KETOROLAC TROMETHAMINE 30 MG: 30 INJECTION, SOLUTION INTRAMUSCULAR at 11:36

## 2020-06-19 RX ADMIN — ALPRAZOLAM 0.25 MG: 0.25 TABLET ORAL at 00:29

## 2020-06-19 RX ADMIN — CEFUROXIME SODIUM 1.5 G: 1.5 INJECTION, POWDER, FOR SOLUTION INTRAVENOUS at 16:01

## 2020-06-19 RX ADMIN — METOPROLOL TARTRATE 12.5 MG: 25 TABLET, FILM COATED ORAL at 11:23

## 2020-06-19 RX ADMIN — SODIUM CHLORIDE, PRESERVATIVE FREE 10 ML: 5 INJECTION INTRAVENOUS at 20:59

## 2020-06-19 RX ADMIN — HYDROCODONE BITARTRATE AND ACETAMINOPHEN 1 TABLET: 7.5; 325 TABLET ORAL at 08:34

## 2020-06-19 RX ADMIN — ALBUMIN HUMAN 500 ML: 0.05 INJECTION, SOLUTION INTRAVENOUS at 13:34

## 2020-06-19 RX ADMIN — TAMSULOSIN HYDROCHLORIDE 0.4 MG: 0.4 CAPSULE ORAL at 08:14

## 2020-06-19 RX ADMIN — SENNOSIDES AND DOCUSATE SODIUM 2 TABLET: 8.6; 5 TABLET ORAL at 08:15

## 2020-06-19 RX ADMIN — ASPIRIN 325 MG: 325 TABLET, COATED ORAL at 08:15

## 2020-06-19 RX ADMIN — CEFUROXIME SODIUM 1.5 G: 1.5 INJECTION, POWDER, FOR SOLUTION INTRAVENOUS at 01:49

## 2020-06-19 RX ADMIN — ATORVASTATIN CALCIUM 40 MG: 40 TABLET, FILM COATED ORAL at 20:58

## 2020-06-19 NOTE — PLAN OF CARE
Problem: Patient Care Overview  Goal: Plan of Care Review  Outcome: Ongoing (interventions implemented as appropriate)  Flowsheets (Taken 6/19/2020 1933)  Progress: improving  Plan of Care Reviewed With: patient  Note:   Neuro- A/O. Moves all extremities equally. Resp- Sats >94 on 4L NC. CT output: 1/2-90mL, 3/4-320mL. Cardiac- NSR, normotensive. Low grade temp has decreased throughout shift. GI- Passed bedside swallow test. Dc'd NG. Hypoactive BS. - Aguila in place, adequate UOP. IVF and Insulin gtt infusing. Will continue to monitor in ICU.

## 2020-06-19 NOTE — PLAN OF CARE
Problem: Patient Care Overview  Goal: Plan of Care Review  Flowsheets (Taken 6/19/2020 0829)  Outcome Summary: PT initial evaluation completed today for pt s/p mitral valve replacement, presenting with generalized weakness, decreased endurance and impaired balance. Pt ambulated 160 ft with min a x1 and L UE support with decreased gait speed and step length bilaterally. Pt required education and VCs regarding hand placement during mobility to maintain sternal precautions. Pt's decreased independence and safety with functional mobility warrants PT skilled care. Recommend D/C home with assist.

## 2020-06-19 NOTE — PROGRESS NOTES
Critical Care Note     LOS: 2 days   Patient Care Team:  Provider, No Known as PCP - General    Chief Complaint/Reason for visit:     Severe mitral valve regurgitation, mitral valve replacement, June 18, 2020  Coronary artery disease with previous stent  Hypertension      Subjective     59-year-old gentleman with hypertension, dyslipidemia, coronary disease with previous stent in 2017 presenting with worsening exertional dyspnea.  Evaluation revealed severe mitral regurgitation.  He had a previous history of a stent to his LAD in June 2016.  He does chew tobacco.  June 18 he underwent mitral valve replacement.  He had no immediate complications and did not require blood products.  He did have a right mainstem intubation with withdrawal of the endotracheal tube 3 cm in the immediate ICU course.  His [place] catheter was coiled in the pulmonary artery and ultimately was removed.    Interval History:     He extubated night of surgery without difficulty.  He complaining of shortness of air.  His appetite is poor.  He denies nausea or vomiting.  He is currently on 4 L nasal cannula with a saturation of 97%.  T-max is 101.  Mediastinal tube output was 865 mL's yesterday.  Urine output was over 3 L.  He did ambulate 160 feet with therapy today.    Review of Systems:    All systems were reviewed and negative except as noted in subjective.    Medical history, surgical history, social history, family history reviewed    Objective     Intake/Output:    Intake/Output Summary (Last 24 hours) at 6/19/2020 1437  Last data filed at 6/19/2020 1400  Gross per 24 hour   Intake 3474.3 ml   Output 1450 ml   Net 2024.3 ml       Nutrition:  Diet Clear Liquid    Infusions:    niCARdipine 5-15 mg/hr    nitroglycerin 5-200 mcg/min Last Rate: Stopped (06/18/20 1500)   norepinephrine 0.02-0.3 mcg/kg/min    phenylephrine 0.5-3 mcg/kg/min        Mechanical Ventilator Settings:         Vt (Set, L): 0.6 L  Resp Rate (Set): 16  Pressure Support  "(cm H2O): 10 cm H20  FiO2 (%): 45 %  PEEP/CPAP (cm H2O): 5 cm H20    Minute Ventilation (L/min) (Obs): 5.55 L/min  Resp Rate (Observed) Vent: 24  I:E Ratio (Set): 1:2.61  I:E Ratio (Obs): 1:1.80    PIP Observed (cm H2O): 15 cm H2O  Plateau Pressure (cm H2O): 0 cm H2O    Telemetry: Sinus rhythm             Vital Signs  Blood pressure 103/65, pulse 67, temperature 99.3 °F (37.4 °C), temperature source Bladder, resp. rate 15, height 170.2 cm (67\"), weight 84.5 kg (186 lb 3.2 oz), SpO2 97 %.    Physical Exam:  General Appearance:   Thin middle-aged white male in no distress   Head:   Atraumatic   Eyes:          Pupils reactive to light, no jaundice   Ears:     Throat:  Oral mucosa moist   Neck:  Right IJ introducer.  Trachea midline.  No palpable thyroid   Back:      Lungs:    Sternotomy incision intact, dressing without drainage.  Mediastinal tubes present with serosanguineous output.  Breath sounds are bilateral, shallow, clear without wheeze or rhonchi    Heart:   Regular rhythm, S1, S2 auscultated   Abdomen:    Nondistended, bowel sounds present, soft   Rectal:   Deferred   Extremities:  No pitting edema or clubbing   Pulses:    Skin:  Warm and dry   Lymph nodes:    Neurologic:  Oriented, anxious, no focal weakness      Results Review:     I reviewed the patient's new clinical results.   Results from last 7 days   Lab Units 06/19/20  0313 06/18/20  1405 06/18/20  1016 06/17/20  0458   SODIUM mmol/L 131* 140 138 139   POTASSIUM mmol/L 4.5 4.4 4.7 3.9   CHLORIDE mmol/L 95* 101 102 105   CO2 mmol/L 24.0 25.0 25.0 24.0   BUN mg/dL 18 16 14 21   CREATININE mg/dL 1.12 1.18 0.95 0.83   CALCIUM mg/dL 8.7 9.3 9.7 8.6   BILIRUBIN mg/dL  --   --   --  1.3*   ALK PHOS U/L  --   --   --  72   ALT (SGPT) U/L  --   --   --  7   AST (SGOT) U/L  --   --   --  11   GLUCOSE mg/dL 126* 141* 123* 101*     Results from last 7 days   Lab Units 06/19/20  0313 06/18/20  1405 06/18/20  1016   WBC 10*3/mm3 9.37 6.86 5.30   HEMOGLOBIN g/dL " 9.0* 9.4* 9.8*   HEMATOCRIT % 29.9* 30.4* 31.4*   PLATELETS 10*3/mm3 140 153 152     Results from last 7 days   Lab Units 06/18/20  1539   PH, ARTERIAL pH units 7.373   PO2 ART mm Hg 82.0*   PCO2, ARTERIAL mm Hg 46.5*   HCO3 ART mmol/L 27.0*     No results found for: BLOODCX  No results found for: URINECX    I reviewed the patient's new imaging including images and reports.    Chest x-ray reveals some mild left base atelectasis, no significant effusion    All medications reviewed.     aspirin 325 mg Oral Daily   atorvastatin 40 mg Oral Nightly   cefuroxime 1.5 g Intravenous Q8H   insulin lispro 0-7 Units Subcutaneous TID AC   metoprolol tartrate 12.5 mg Oral Q12H   sennosides-docusate 2 tablet Oral BID   sertraline 100 mg Oral Daily   sodium chloride 10 mL Intravenous Q12H   tamsulosin 0.4 mg Oral Daily         Assessment/Plan       Severe mitral valve regurgitation    Hypertension    Dyslipidemia    BPH     Anxiety and depression    Chewing tobacco use    #1 severe mitral valve regurgitation, postoperative day #1 mitral valve replacement.  He did receive 2 sixpacks of platelets and surgery.  Mediastinal drainage has decreased significantly.  This morning hemoglobin is 9.0.  He extubated night of surgery and is currently on nasal cannula.  He remains in sinus rhythm.  He did ambulate with physical therapy.  Fever to 101 last night    #2 hypertension blood pressure remains 100-140 he is on metoprolol, Flomax.  At home he took metoprolol, amlodipine, Imdur and Flomax    #3 dyslipidemia currently on Lipitor      PLAN:    Restart home dose of Zoloft  Toradol for fever and postoperative pain  Remove arterial line  Ambulate  Appears a little volume depleted, will give an additional albumin  On aspirin, statin, beta-blocker  Continue Flomax  Restart Norvasc for sustained hypertension  Up with physical therapy  Monitor rhythm, mediastinal drainage, urine output    VTE Prophylaxis: Foot pumps    Stress Ulcer Prophylaxis:  None    Josie Beltran MD  06/19/20  14:37      Time: Critical care 25 min  I personally provided care to this critically ill patient as documented above.  Critical care time does not include time spent on separately billed procedures.  Non of my critical care time was concurrent with other critical care providers.

## 2020-06-19 NOTE — THERAPY EVALUATION
Patient Name: Rakan Boone  : 1960    MRN: [id number]                              Today's Date: 2020       Admit Date: 2020    Visit Dx:     ICD-10-CM ICD-9-CM   1. Mitral valve insufficiency, unspecified etiology I34.0 424.0   2. Abnormal stress test R94.39 794.39     Patient Active Problem List   Diagnosis   • Severe mitral valve regurgitation   • Hypertension   • Dyslipidemia   • BPH    • Anxiety and depression   • Chewing tobacco use     Past Medical History:   Diagnosis Date   • Anxiety    • Depression    • HLD (hyperlipidemia)    • Hypertension      Past Surgical History:   Procedure Laterality Date   • ANKLE SURGERY     • ARM AMPUTATION     • CARDIAC CATHETERIZATION N/A 2020    Procedure: Left Heart Cath [id number]  @ [facility];  Surgeon: Refugio Goldstein MD;  Location:  [facility] CATH INVASIVE LOCATION;  Service: Cardiovascular;  Laterality: N/A;   • CORONARY ANGIOPLASTY WITH STENT PLACEMENT     • MITRAL VALVE REPAIR/REPLACEMENT N/A 2020    Procedure: MITRAL VALVEREPLACEMENT, MITRAL CLIP, TRANSESOPHAGEAL ECHOCARDIOGRAM;  Surgeon: Cooper Burden MD;  Location:  [facility];  Service: Cardiothoracic;  Laterality: N/A;   • ROTATOR CUFF REPAIR       General Information     Row Name 20 1017          PT Evaluation Time/Intention    Document Type  evaluation  (Pended)   -     Mode of Treatment  physical therapy  (Pended)   -     Row Name 20 1017          General Information    Patient Profile Reviewed?  yes  (Pended)   -     Prior Level of Function  independent:;all household mobility;gait;transfer;ADL's;grooming;dressing;bathing  (Pended)   -     Existing Precautions/Restrictions  cardiac;sternal;fall  (Pended)   -     Barriers to Rehab  none identified  (Pended)   -     Row Name 20 1017          Relationship/Environment    Lives With  spouse;other relative(s)  (Pended)  Mother-in-law  -     Row Name 20 1017           Resource/Environmental Concerns    Current Living Arrangements  home/apartment/condo  (Pended)   -[facility] Name 06/19/20 1017          Home Main Entrance    Number of Stairs, Main Entrance  none  (Pended)   -[facility] Name 06/19/20 1017          Stairs Within Home, Primary    Number of Stairs, Within Home, Primary  none  (Pended)   -[facility] Name 06/19/20 1017          Cognitive Assessment/Intervention- PT/OT    Orientation Status (Cognition)  oriented x 3  (Pended)   -[facility] Name 06/19/20 1017          Safety Issues, Functional Mobility    Safety Issues Affecting Function (Mobility)  awareness of need for assistance;insight into deficits/self awareness;safety precaution awareness;safety precautions follow-through/compliance  (Pended)   -     Impairments Affecting Function (Mobility)  balance;coordination;endurance/activity tolerance;postural/trunk control;shortness of breath;strength  (Pended)   -     Comment, Safety Issues/Impairments (Mobility)  Pt required VCs to maintain sternal precautions throughout treatment.  (Pended)   -       User Key  (r) = Recorded By, (t) = Taken By, (c) = Cosigned By    Initials Name Provider Type     Sabrina Ortiz, PT Student PT Student        Mobility     [facility] Name 06/19/20 1019          Bed Mobility Assessment/Treatment    Comment (Bed Mobility)  UIC upon arrival  (Pended)   -[facility] Name 06/19/20 1019          Transfer Assessment/Treatment    Comment (Transfers)  VCs for sequencing and hand placement to maintain sternal precautions during STS  (Pended)   -[facility] Name 06/19/20 1019          Sit-Stand Transfer    Sit-Stand [place] (Transfers)  minimum assist (75% patient effort);2 person assist;verbal cues  (Pended)   -     Assistive Device (Sit-Stand Transfers)  other (see comments)  (Pended)  L UE support  -[facility] Name 06/19/20 1019          Gait/Stairs Assessment/Training    Gait/Stairs Assessment/Training  gait/ambulation independence   (Pended)   -     [place] Level (Gait)  minimum assist (75% patient effort);2 person assist;verbal cues  (Pended)   -     Assistive Device (Gait)  other (see comments)  (Pended)  LUE support  -     Distance in Feet (Gait)  160  (Pended)   -     Pattern (Gait)  step-through  (Pended)   -     Deviations/Abnormal Patterns (Gait)  traci decreased;stride length decreased  (Pended)   -     Bilateral Gait Deviations  heel strike decreased  (Pended)   -     Comment (Gait/Stairs)  Pt ambulated with step through gait pattern with L UE support, min a x2. Pt demonstrated decreased gait speed and step length bilaterally and occassional unsteadiness on feet but no instances of LOB. Gait limited by generalized weakness, decreased endurance and fatigue.   (Pended)   -       User Key  (r) = Recorded By, (t) = Taken By, (c) = Cosigned By    Initials Name Provider Type     Sabrina Ortiz, PT Student PT Student        Obj/Interventions     [facility] Name 06/19/20 1024          General ROM    GENERAL ROM COMMENTS  BLE WFL  (Pended)   -SH     Row Name 06/19/20 1024          MMT (Manual Muscle Testing)    General MMT Comments  BLE grossly 3+/5 for functional mobility  (Pended)   -SH     Row Name 06/19/20 1024          Static Sitting Balance    Level of [place] (Unsupported Sitting, Static Balance)  contact guard assist  (Pended)   -     Sitting Position (Unsupported Sitting, Static Balance)  sitting in chair  (Pended)   -SH     Row Name 06/19/20 1024          Static Standing Balance    Level of [place] (Supported Standing, Static Balance)  contact guard assist  (Pended)   -     Assistive Device Utilized (Supported Standing, Static Balance)  other (see comments)  (Pended)  L UE support  -[facility] Name 06/19/20 1024          Sensory Assessment/Intervention    Sensory General Assessment  no sensation deficits identified  (Pended)   -       User Key  (r) = Recorded By, (t) = Taken By, (c) = Cosigned By     Initials Name Provider Type     OrtizSabrina, PT Student PT Student        Goals/Plan     Row Name 06/19/20 1028          Bed Mobility Goal 1 (PT)    Activity/Assistive Device (Bed Mobility Goal 1, PT)  bed mobility activities, all  (Pended)   -     [place] Level/Cues Needed (Bed Mobility Goal 1, PT)  independent  (Pended)   -     Time Frame (Bed Mobility Goal 1, PT)  2 weeks  (Pended)   -     Progress/Outcomes (Bed Mobility Goal 1, PT)  goal ongoing  (Pended)   -SH     Row Name 06/19/20 1028          Transfer Goal 1 (PT)    Activity/Assistive Device (Transfer Goal 1, PT)  sit-to-stand/stand-to-sit;bed-to-chair/chair-to-bed  (Pended)   -     [place] Level/Cues Needed (Transfer Goal 1, PT)  independent  (Pended)   -     Time Frame (Transfer Goal 1, PT)  2 weeks  (Pended)   -     Progress/Outcome (Transfer Goal 1, PT)  goal ongoing  (Pended)   -SH     Row Name 06/19/20 1028          Gait Training Goal 1 (PT)    Activity/Assistive Device (Gait Training Goal 1, PT)  gait (walking locomotion)  (Pended)   -     [place] Level (Gait Training Goal 1, PT)  independent  (Pended)   -     Distance (Gait Goal 1, PT)  400  (Pended)   -     Time Frame (Gait Training Goal 1, PT)  2 weeks  (Pended)   -     Progress/Outcome (Gait Training Goal 1, PT)  goal ongoing  (Pended)   -       User Key  (r) = Recorded By, (t) = Taken By, (c) = Cosigned By    Initials Name Provider Type     Sabrina Ortiz, PT Student PT Student        Clinical Impression     Row Name 06/19/20 1024          Pain Assessment    Additional Documentation  Pain Scale: Numbers Pre/Post-Treatment (Group)  (Pended)   -SH     Row Name 06/19/20 1024          Pain Scale: Numbers Pre/Post-Treatment    Pain Scale: Numbers, Pretreatment  0/10 - no pain  (Pended)   -     Pain Scale: Numbers, Post-Treatment  0/10 - no pain  (Pended)   -     Pre/Post Treatment Pain Comment  Pt reported only experiencing pain in his chest during  deep breathing or coughing.  (Pended)   -     Pain Intervention(s)  Ambulation/increased activity  (Pended)   -[facility] Name 06/19/20 1024          Physical Therapy Clinical Impression    Patient/Family Goals Statement (PT Clinical Impression)  Return to PLOF  (Pended)   -     Criteria for Skilled Interventions Met (PT Clinical Impression)  yes;treatment indicated  (Pended)   -     Rehab Potential (PT Clinical Summary)  good, to achieve stated therapy goals  (Pended)   -SH     Row Name 06/19/20 1024          Vital Signs    Pre Systolic BP Rehab  115  (Pended)   -     Pre Treatment Diastolic BP  82  (Pended)   -     Post Systolic BP Rehab  86  (Pended)   -     Post Treatment Diastolic BP  73  (Pended)   -     Pretreatment Heart Rate (beats/min)  71  (Pended)   -     Posttreatment Heart Rate (beats/min)  72  (Pended)   -     Pre SpO2 (%)  99  (Pended)   -     O2 Delivery Pre Treatment  nasal cannula  (Pended)   -     Post SpO2 (%)  99  (Pended)   -     O2 Delivery Post Treatment  nasal cannula  (Pended)   -     Pre Patient Position  Sitting  (Pended)   -     Intra Patient Position  Standing  (Pended)   -     Post Patient Position  Sitting  (Pended)   -SH     Row Name 06/19/20 1024          Positioning and Restraints    Pre-Treatment Position  sitting in chair/recliner  (Pended)   -     Post Treatment Position  chair  (Pended)   -     In Chair  notified nsg;reclined;call light within reach;encouraged to call for assist;RUE elevated;LUE elevated;legs elevated;waffle cushion  (Pended)   -       User Key  (r) = Recorded By, (t) = Taken By, (c) = Cosigned By    Initials Name Provider Type     Sabrina Ortiz, PT Student PT Student        Outcome Measures     [facility] Name 06/19/20 1029          How much help from another person do you currently need...    Turning from your back to your side while in flat bed without using bedrails?  2  (Pended)   -     Moving from lying on back to  sitting on the side of a flat bed without bedrails?  2  (Pended)   -SH     Moving to and from a bed to a chair (including a wheelchair)?  3  (Pended)   -SH     Standing up from a chair using your arms (e.g., wheelchair, bedside chair)?  3  (Pended)   -SH     Climbing 3-5 steps with a railing?  2  (Pended)   -SH     To walk in hospital room?  3  (Pended)   -SH     AM-PAC 6 Clicks Score (PT)  15  (Pended)   -     Row Name 06/19/20 1029          Functional Assessment    Outcome Measure Options  AM-PAC 6 Clicks Basic Mobility (PT)  (Pended)   -       User Key  (r) = Recorded By, (t) = Taken By, (c) = Cosigned By    Initials Name Provider Type    Sabrina Alvarado, PT Student PT Student        Physical Therapy Education                 Title: PT OT SLP Therapies (In Progress)     Topic: Physical Therapy (In Progress)     Point: Mobility training (In Progress)     Description:   Instruct learner(s) on safety and technique for assisting patient out of bed, chair or wheelchair.  Instruct in the proper use of assistive devices, such as walker, crutches, cane or brace.              Patient Friendly Description:   It's important to get you on your feet again, but we need to do so in a way that is safe for you. Falling has serious consequences, and your personal safety is the most important thing of all.        When it's time to get out of bed, one of us or a family member will sit next to you on the bed to give you support.     If your doctor or nurse tells you to use a walker, crutches, a cane, or a brace, be sure you use it every time you get out of bed, even if you think you don't need it.    Learning Progress Summary           Patient Acceptance, E,D, NR by  at 6/19/2020 0834                   Point: Home exercise program (Not Started)     Description:   Instruct learner(s) on appropriate technique for monitoring, assisting and/or progressing patient with therapeutic exercises and activities.              Learner  Progress:   Not documented in this visit.          Point: Body mechanics (In Progress)     Description:   Instruct learner(s) on proper positioning and spine alignment for patient and/or caregiver during mobility tasks and/or exercises.              Learning Progress Summary           Patient Acceptance, E,D, NR by  at 6/19/2020 0829                   Point: Precautions (In Progress)     Description:   Instruct learner(s) on prescribed precautions during mobility and gait tasks              Learning Progress Summary           Patient Acceptance, E,D, NR by  at 6/19/2020 0829                               User Key     Initials Effective Dates Name Provider Type Discipline     03/19/20 -  Sabrina Ortiz, PT Student PT Student PT              PT Recommendation and Plan  Planned Therapy Interventions (PT Eval): (P) balance training, bed mobility training, gait training, home exercise program, strengthening, stair training, transfer training  Outcome Summary/Treatment Plan (PT)  Anticipated Discharge Disposition (PT): (P) home with assist  Outcome Summary: (P) PT initial evaluation completed today for pt s/p mitral valve replacement, presenting with generalized weakness, decreased endurance and impaired balance. Pt ambulated 160 ft with min a x1 and L UE support with decreased gait speed and step length bilaterally. Pt required education and VCs regarding hand placement during mobility to maintain sternal precautions. Pt's decreased independence and safety with functional mobility warrants PT skilled care. Recommend D/C home with assist.     Time Calculation:   PT Charges     Row Name 06/19/20 0829             Time Calculation    Start Time  0829  (Pended)   -      PT Received On  06/19/20  (Pended)   -      PT Goal Re-Cert Due Date  06/29/20  (Pended)   -        User Key  (r) = Recorded By, (t) = Taken By, (c) = Cosigned By    Initials Name Provider Type     Sabrina Ortiz, PT Student PT Student         Therapy Charges for Today     Code Description Service Date Service Provider Modifiers Qty    [id number] HC PT EVAL MOD COMPLEXITY 4 6/19/2020 Sabrina Ortiz, PT Student GP 1          PT G-Codes  Outcome Measure Options: (P) AM-PAC 6 Clicks Basic Mobility (PT)  AM-PAC 6 Clicks Score (PT): (P) 15    Sabrina Ortiz, PT Student  6/19/2020

## 2020-06-19 NOTE — PROGRESS NOTES
Continued Stay Note  [facility]     Patient Name: Rakan Bonoe  MRN: [id number]  Today's Date: 6/19/2020    Admit Date: 6/16/2020    Discharge Plan     Row Name 06/19/20 1339       Plan    Plan  Home.     Patient/Family in Agreement with Plan  yes    Plan Comments  Met with patient in the room.  Talkative.  Independent with ADL prior to admission.  O2 at 2L/NC at this time.  Denies needs at this tiime.  Therapy recommending home with assist.  Ambulated 220 ft today.  Following for discharge needs.      Final Discharge Disposition Code  01 - home or self-care        Discharge Codes    No documentation.       Expected Discharge Date and Time     Expected Discharge Date Expected Discharge Time    Jun 16, 2020             Yamileth Roberson RN

## 2020-06-19 NOTE — PLAN OF CARE
Problem: Patient Care Overview  Goal: Plan of Care Review  Outcome: Ongoing (interventions implemented as appropriate)  Flowsheets (Taken 6/19/2020 7707)  Plan of Care Reviewed With: patient  Outcome Summary: Pt. alert and oriented. Walked twice with no complications. Home Zoloft restarted. Weaned to 2L NC. Minimal chest tube drainage. IS to 1500. Remains in Normal Sinus and normotensive. Tolerated lopressor.  Radial art line d/c'd today with no complications. Additional 500 of albumin given.  Transitioned to regular consistent carb diet with adequate appetite. Insulin transitioned today. No BM. Aguila d/c'd without complicaitons, due to void at 2000. Home flomax restarted. Pain managed well with one dose IV Toradol. No other big changes today.

## 2020-06-19 NOTE — PROGRESS NOTES
CTS Progress Note       LOS: 2 days   Patient Care Team:  Provider, No Known as PCP - General    Chief Complaint: Severe mitral valve regurgitation    Vital Signs:  Temp:  [95.3 °F (35.2 °C)-101.1 °F (38.4 °C)] 99.5 °F (37.5 °C)  Heart Rate:  [63-80] 73  Resp:  [14-25] 23  BP: ()/(46-93) 119/93  Arterial Line BP: ()/(41-68) 113/66  FiO2 (%):  [45 %-100 %] 45 %    Physical Exam: Extubated up in chair no neurologic deficit extubated no neurologic deficits satisfactory progress       Results:   Results from last 7 days   Lab Units 06/19/20  0313   WBC 10*3/mm3 9.37   HEMOGLOBIN g/dL 9.0*   HEMATOCRIT % 29.9*   PLATELETS 10*3/mm3 140     Results from last 7 days   Lab Units 06/19/20  0313   SODIUM mmol/L 131*   POTASSIUM mmol/L 4.5   CHLORIDE mmol/L 95*   CO2 mmol/L 24.0   BUN mg/dL 18   CREATININE mg/dL 1.12   GLUCOSE mg/dL 126*   CALCIUM mg/dL 8.7           Imaging Results (Last 24 Hours)     Procedure Component Value Units Date/Time    XR Chest 1 View [[id number]] Resulted:  06/19/20 0337     Updated:  06/19/20 0338    XR Chest 1 View [[id number]] Collected:  06/18/20 1135     Updated:  06/18/20 1651    Narrative:          EXAMINATION: XR CHEST 1 VW-06/18/2020:      INDICATION: Tube repositioning; I34.0-Nonrheumatic mitral (valve)  insufficiency; R94.39-Abnormal result of other cardiovascular function  study.      COMPARISON: Chest x-ray dated 06/18/2020.     FINDINGS: Interval repositioning of the endotracheal tube now  approximately 2.5 cm above the level of the rodney. Support hardware  otherwise unchanged. No pneumothorax or pleural effusion.       Impression:       Interval repositioning of the endotracheal tube now  approximately 2.5 cm above the level the rodney. Support hardware  otherwise unchanged. No pneumothorax or pleural effusion.     D:  06/18/2020  E:  06/18/2020     This report was finalized on 6/18/2020 4:47 PM by Dr. Nash Tellez.       XR Chest 1 View [[id number]] Collected:  06/18/20  1044     Updated:  06/18/20 1650    Narrative:          EXAMINATION: XR CHEST 1 VW-06/18/2020:      INDICATION: Post-Op Check Line & Tube Placement; I34.0-Nonrheumatic  mitral (valve) insufficiency; R94.39-Abnormal result of other  cardiovascular function study.      COMPARISON: Chest x-ray dated 06/16/2020.     FINDINGS: Postoperative appearance of the chest status post median  sternotomy with endotracheal tube below the level of the rodney in the  right mainstem bronchus of a right mainstem intubation. Esophagogastric  tube courses below the diaphragm and out of the field-of-view.  Mediastinal and pleural drains in place. Right internal jugular  pulmonary arterial catheter is likely coiled within the left main  pulmonary artery. No pneumothorax or large effusion.       Impression:       Postoperative appearance of the chest status post median  sternotomy with endotracheal tube below the level of the rodney in the  right mainstem bronchus of a right mainstem intubation with  repositioning recommended. Esophagogastric tube courses below the  diaphragm and out of the field-of-view. Mediastinal and pleural drains  in place. Right internal jugular pulmonary arterial catheter is likely  coiled within the left main pulmonary artery.     D:  06/18/2020  E:  06/18/2020     This report was finalized on 6/18/2020 4:47 PM by Dr. Nash Tellez.             Assessment      Severe mitral valve regurgitation    Hypertension    Dyslipidemia    BPH     Anxiety and depression    Chewing tobacco use    Extubated up in chair no neurologic deficits.  Note atrial appendage was ligated at time of surgery    Plan   Continuing supportive care    Please note that portions of this note were completed with a voice recognition program. Efforts were made to edit the dictations, but occasionally words are mistranscribed.    Cooper Burden MD  06/19/20  07:21

## 2020-06-19 NOTE — PROGRESS NOTES
[facility]       LOS: 2 days   Patient Care Team:  Provider, No Known as PCP - General        Subjective       Patient Denies: Up in chair.  Sore but no other complaints      Vital Signs  Temp:  [95.3 °F (35.2 °C)-101.1 °F (38.4 °C)] 99.5 °F (37.5 °C)  Heart Rate:  [63-80] 73  Resp:  [14-25] 23  BP: ()/(46-93) 119/93  Arterial Line BP: ()/(41-68) 113/66  FiO2 (%):  [45 %-100 %] 45 %    Intake/Output Summary (Last 24 hours) at 6/19/2020 0740  Last data filed at 6/19/2020 0600  Gross per 24 hour   Intake 5500.3 ml   Output 4295 ml   Net 1205.3 ml     No intake/output data recorded.    Physical Exam:     General Appearance:   Sitting in chair.  Alert and oriented       Neck:   No adenopathy, supple, trachea midline, no thyromegaly, no JVD   Lungs:     Clear to auscultation anteriorly,respirations regular, even and                unlabored    Heart:    Regular rhythm and normal rate, normal S1 and S2, no         murmur, no gallop, no rub, no click   Chest Wall:    No abnormalities observed   Abdomen:     Normal bowel sounds, no masses, no organomegaly, soft        Extremities:  Trace edema   Pulses:   Pulses palpable and equal bilaterally     Results Review:     I reviewed the patient's new clinical results.      WBC WBC   Date/Time Value Ref Range Status   06/19/2020 0313 9.37 3.40 - 10.80 10*3/mm3 Final   06/18/2020 1405 6.86 3.40 - 10.80 10*3/mm3 Final   06/18/2020 1016 5.30 3.40 - 10.80 10*3/mm3 Final   06/17/2020 0458 5.36 3.40 - 10.80 10*3/mm3 Final   06/16/2020 1136 6.30 3.40 - 10.80 10*3/mm3 Final            HGB Hemoglobin   Date/Time Value Ref Range Status   06/19/2020 0313 9.0 (L) 13.0 - 17.7 g/dL Final   06/18/2020 1405 9.4 (L) 13.0 - 17.7 g/dL Final   06/18/2020 1016 9.8 (L) 13.0 - 17.7 g/dL Final   06/18/2020 0916 9.5 (L) 12.0 - 17.0 g/dL Final   06/18/2020 0846 10.5 (L) 12.0 - 17.0 g/dL Final   06/18/2020 0821 8.8 (L) 12.0 - 17.0  g/dL Final   06/18/2020 0815 8.2 (L) 12.0 - 17.0 g/dL Final   06/18/2020 0755 10.2 (L) 12.0 - 17.0 g/dL Final   06/18/2020 0703 11.9 (L) 12.0 - 17.0 g/dL Final   06/17/2020 0458 11.3 (L) 13.0 - 17.7 g/dL Final   06/16/2020 1136 12.9 (L) 13.0 - 17.7 g/dL Final           HCT Hematocrit   Date/Time Value Ref Range Status   06/19/2020 0313 29.9 (L) 37.5 - 51.0 % Final   06/18/2020 1405 30.4 (L) 37.5 - 51.0 % Final   06/18/2020 1016 31.4 (L) 37.5 - 51.0 % Final   06/18/2020 0916 28 (L) 38 - 51 % Final   06/18/2020 0846 31 (L) 38 - 51 % Final   06/18/2020 0821 26 (L) 38 - 51 % Final   06/18/2020 0815 24 (L) 38 - 51 % Final   06/18/2020 0755 30 (L) 38 - 51 % Final   06/18/2020 0703 35 (L) 38 - 51 % Final   06/17/2020 0458 37.7 37.5 - 51.0 % Final   06/16/2020 1136 42.1 37.5 - 51.0 % Final            Platlets Platelets   Date/Time Value Ref Range Status   06/19/2020 0313 140 140 - 450 10*3/mm3 Final   06/18/2020 1405 153 140 - 450 10*3/mm3 Final   06/18/2020 1016 152 140 - 450 10*3/mm3 Final   06/17/2020 0458 157 140 - 450 10*3/mm3 Final   06/16/2020 1136 196 140 - 450 10*3/mm3 Final     Sodium  Sodium   Date/Time Value Ref Range Status   06/19/2020 0313 131 (L) 136 - 145 mmol/L Final   06/18/2020 1405 140 136 - 145 mmol/L Final   06/18/2020 1016 138 136 - 145 mmol/L Final   06/17/2020 0458 139 136 - 145 mmol/L Final   06/16/2020 1136 139 136 - 145 mmol/L Final     Potassium  Potassium   Date/Time Value Ref Range Status   06/19/2020 0313 4.5 3.5 - 5.2 mmol/L Final   06/18/2020 1405 4.4 3.5 - 5.2 mmol/L Final   06/18/2020 1016 4.7 3.5 - 5.2 mmol/L Final   06/17/2020 0458 3.9 3.5 - 5.2 mmol/L Final   06/16/2020 1136 3.7 3.5 - 5.2 mmol/L Final     Chloride  Chloride   Date/Time Value Ref Range Status   06/19/2020 0313 95 (L) 98 - 107 mmol/L Final   06/18/2020 1405 101 98 - 107 mmol/L Final   06/18/2020 1016 102 98 - 107 mmol/L Final   06/17/2020 0458 105 98 - 107 mmol/L Final   06/16/2020 1136 103 98 - 107 mmol/L Final      BicarbonateNo results found for: PLASMABICARB    BUN BUN   Date/Time Value Ref Range Status   06/19/2020 0313 18 8 - 23 mg/dL Final   06/18/2020 1405 16 8 - 23 mg/dL Final   06/18/2020 1016 14 8 - 23 mg/dL Final   06/17/2020 0458 21 8 - 23 mg/dL Final   06/16/2020 1136 18 8 - 23 mg/dL Final      Creatinine Creatinine   Date/Time Value Ref Range Status   06/19/2020 0313 1.12 0.76 - 1.27 mg/dL Final   06/18/2020 1405 1.18 0.76 - 1.27 mg/dL Final   06/18/2020 1016 0.95 0.76 - 1.27 mg/dL Final   06/17/2020 0458 0.83 0.76 - 1.27 mg/dL Final   06/16/2020 1136 0.97 0.76 - 1.27 mg/dL Final      Calcium Calcium   Date/Time Value Ref Range Status   06/19/2020 0313 8.7 8.6 - 10.5 mg/dL Final   06/18/2020 1405 9.3 8.6 - 10.5 mg/dL Final   06/18/2020 1016 9.7 8.6 - 10.5 mg/dL Final   06/17/2020 0458 8.6 8.6 - 10.5 mg/dL Final   06/16/2020 1136 9.2 8.6 - 10.5 mg/dL Final      Mag @RESULFAST(MG:3)@        PT/INR:       Lab Results   Component Value Date    PROTIME 15.9 (H) 06/19/2020    PROTIME 16.9 (H) 06/18/2020    PROTIME 14.2 (H) 06/16/2020    INR 1.30 (H) 06/19/2020    INR 1.41 (H) 06/18/2020    INR 1.13 06/16/2020      Troponin I:  No results found for: TROPONINI No results found for: CKTOTAL, CKMB, CKMBINDEX, POCRTROPI, TROPONINT      amLODIPine 5 mg Oral Daily   aspirin 325 mg Oral Daily   atorvastatin 40 mg Oral Nightly   cefuroxime 1.5 g Intravenous Q8H   chlorhexidine 15 mL Mouth/Throat Q12H   metoprolol tartrate 12.5 mg Oral Q12H   metoprolol tartrate 2.5 mg Intravenous Q6H   sennosides-docusate 2 tablet Oral BID   sertraline 100 mg Oral Daily   sodium chloride 10 mL Intravenous Q12H   tamsulosin 0.4 mg Oral Daily       dexmedetomidine 0.2-1.5 mcg/kg/hr Last Rate: 0.6 mcg/kg/hr (06/18/20 1545)   dextrose 5 % with KCl 20 mEq 30 mL/hr Last Rate: 30 mL/hr (06/18/20 1250)   DOBUTamine 2-20 mcg/kg/min    DOPamine 2-20 mcg/kg/min    EPINEPHrine 0.02-0.3 mcg/kg/min    insulin 0-50 Units/hr Last Rate: 1.8 Units/hr  (06/19/20 0600)   niCARdipine 5-15 mg/hr    nitroglycerin 5-200 mcg/min Last Rate: Stopped (06/18/20 1500)   norepinephrine 0.02-0.3 mcg/kg/min    phenylephrine 0.5-3 mcg/kg/min    propofol 5-50 mcg/kg/min Last Rate: Stopped (06/18/20 1404)   sodium chloride 30 mL/hr    vasopressin 0.02-0.1 Units/min        Assessment/Plan     Patient Active Problem List   Diagnosis Code   • Severe mitral valve regurgitation I34.0   • Hypertension I10   • Dyslipidemia E78.5   • BPH  N40.0   • Anxiety and depression F41.9, F32.9   • Chewing tobacco use Z72.0     CV stable post tissue MVR and left atrial appendage ligation  Stable coronary artery disease  Normal preop LV function    Resume low-dose beta-blocker  Mobilize    Jessee Palma MD  06/19/20  07:40

## 2020-06-19 NOTE — PROGRESS NOTES
Clinical Nutrition   Reason For Visit: TOBY CALDERON    Patient Name: Rakan Boone  YOB: 1960  MRN: [id number]  Date of Encounter: 06/19/20 11:22  Admission date: 6/16/2020      Nutrition Assessment     Admission Problem List:  Severe mitral regurgitation  S/p MVR, L atrial appendage clip and ligation (6/18)        PMH: He  has a past medical history of Anxiety, Depression, HLD (hyperlipidemia), and Hypertension.   PSxH: He  has a past surgical history that includes Coronary angioplasty with stent; Arm amputation; Rotator cuff repair; Ankle surgery; Cardiac catheterization (N/A, 6/16/2020); and mitral valve repair/replacement (N/A, 6/18/2020).        Reported/Observed/Food/Nutrition Related History     Pt sitting up in chair at time of RD visit. Pt reports that he tolerated clear liquids this AM, liked the broth. Pt states that he would like to continue with clear liquids for lunch today. Advised pt to let nursing staff know when he feels ready for solid foods and that his diet can be advanced when he is ready. Pt states poor appetite for the past 1 month PTA> Reports that he has been getting full quickly, bloating. Reports that he does not have any food allergies, does not drink oral nutrition supplements at home.       Anthropometrics   Height: 67 in  Weight: 185 lb per standing scale (6/16)  BMI: 29.1  BMI classification: Overweight: 25.0-29.9kg/m2      Weight change: Pt reports no recent weight loss or weight changes.     Date Weight (kg) Weight (lbs) Weight Method   6/16/2020 84.46 kg 186 lb 3.2 oz -   6/16/2020 84.324 kg 185 lb 14.4 oz Standing scale   11/12/2014 83.92 kg 185 lb 0.2 oz -   2/4/2014 83.92 kg 185 lb 0.2 oz -   1/16/2014 83.92 kg 185 lb 0.2 oz -   1/15/2014 83.92 kg 185 lb 0.2 oz -       Labs reviewed   Labs reviewed: Yes    Results from last 7 days   Lab Units 06/19/20  0313 06/18/20  1405 06/18/20  1016  06/16/20  1831   SODIUM mmol/L 131* 140 138   < >  --    POTASSIUM mmol/L 4.5  4.4 4.7   < >  --    CHLORIDE mmol/L 95* 101 102   < >  --    CO2 mmol/L 24.0 25.0 25.0   < >  --    BUN mg/dL 18 16 14   < >  --    CREATININE mg/dL 1.12 1.18 0.95   < >  --    GLUCOSE mg/dL 126* 141* 123*   < >  --    CALCIUM mg/dL 8.7 9.3 9.7   < >  --    PHOSPHORUS mg/dL 4.9* 3.4 3.1  --   --    MAGNESIUM mg/dL 2.5* 2.8* 3.2*  --   --    CHOLESTEROL mg/dL  --   --   --   --  104   TRIGLYCERIDES mg/dL  --   --   --   --  150    < > = values in this interval not displayed.     Results from last 7 days   Lab Units 06/19/20  0313 06/18/20  1405 06/18/20  1016  06/16/20  1831   WBC 10*3/mm3 9.37 6.86 5.30   < >  --    ALBUMIN g/dL 4.60 4.40 3.70   < >  --    CHOLESTEROL mg/dL  --   --   --   --  104   TRIGLYCERIDES mg/dL  --   --   --   --  150    < > = values in this interval not displayed.     Results from last 7 days   Lab Units 06/19/20  1006 06/19/20  0915 06/19/20  0751 06/19/20  0647 06/19/20  0543 06/19/20  0343   GLUCOSE mg/dL 117 121 121 114 114 121     Lab Results   Lab Value Date/Time    HGBA1C 4.90 06/16/2020 1830     Medications reviewed   Medications reviewed: Yes  Pertinent: insulin, pericolace  PRN: xanax, norco, zofran, percocet      Current Nutrition Prescription   PO: Diet Clear Liquid      Evaluation of Received Nutrient/Fluid Intake-PO:  Insufficient data       Nutrition Diagnosis   6/19  Problem Inadequate oral intake   Etiology Clinical condition, diet order   Signs/Symptoms Pt s/p MVR, clear liquid diet, recent poor appetite/intake       Intervention   Intervention: Follow treatment progress, Care plan reviewed, Interview for preferences, Encourage intake      Goal:   General: Nutrition support treatment  PO: Advace diet as medically appropriate       Monitoring/Evaluation:       Monitoring/Evaluation: Per protocol, PO intake, Pertinent labs, Symptoms       Will Continue to follow per protocol  Estrella Quispe, MS RD/LD CNSC  Time Spent: 30 minutes

## 2020-06-20 ENCOUNTER — APPOINTMENT (OUTPATIENT)
Dept: GENERAL RADIOLOGY | Facility: HOSPITAL | Age: 60
End: 2020-06-20

## 2020-06-20 LAB
ANION GAP SERPL CALCULATED.3IONS-SCNC: 11 MMOL/L (ref 5–15)
BUN BLD-MCNC: 21 MG/DL (ref 8–23)
BUN/CREAT SERPL: 20.4 (ref 7–25)
CALCIUM SPEC-SCNC: 8.6 MG/DL (ref 8.6–10.5)
CHLORIDE SERPL-SCNC: 94 MMOL/L (ref 98–107)
CO2 SERPL-SCNC: 24 MMOL/L (ref 22–29)
CREAT BLD-MCNC: 1.03 MG/DL (ref 0.76–1.27)
DEPRECATED RDW RBC AUTO: 44.6 FL (ref 37–54)
ERYTHROCYTE [DISTWIDTH] IN BLOOD BY AUTOMATED COUNT: 14.5 % (ref 12.3–15.4)
GFR SERPL CREATININE-BSD FRML MDRD: 74 ML/MIN/1.73
GLUCOSE BLD-MCNC: 121 MG/DL (ref 65–99)
GLUCOSE BLDC GLUCOMTR-MCNC: 106 MG/DL (ref 70–130)
GLUCOSE BLDC GLUCOMTR-MCNC: 118 MG/DL (ref 70–130)
GLUCOSE BLDC GLUCOMTR-MCNC: 130 MG/DL (ref 70–130)
HCT VFR BLD AUTO: 27.8 % (ref 37.5–51)
HGB BLD-MCNC: 8.5 G/DL (ref 13–17.7)
MCH RBC QN AUTO: 25.7 PG (ref 26.6–33)
MCHC RBC AUTO-ENTMCNC: 30.6 G/DL (ref 31.5–35.7)
MCV RBC AUTO: 84 FL (ref 79–97)
PLATELET # BLD AUTO: 109 10*3/MM3 (ref 140–450)
PMV BLD AUTO: 12.6 FL (ref 6–12)
POTASSIUM BLD-SCNC: 4.5 MMOL/L (ref 3.5–5.2)
RBC # BLD AUTO: 3.31 10*6/MM3 (ref 4.14–5.8)
SODIUM BLD-SCNC: 129 MMOL/L (ref 136–145)
WBC NRBC COR # BLD: 6 10*3/MM3 (ref 3.4–10.8)

## 2020-06-20 PROCEDURE — 25010000003 CEFUROXIME SODIUM 1.5 G RECONSTITUTED SOLUTION: Performed by: PHYSICIAN ASSISTANT

## 2020-06-20 PROCEDURE — 80048 BASIC METABOLIC PNL TOTAL CA: CPT | Performed by: PHYSICIAN ASSISTANT

## 2020-06-20 PROCEDURE — 97530 THERAPEUTIC ACTIVITIES: CPT

## 2020-06-20 PROCEDURE — 82962 GLUCOSE BLOOD TEST: CPT

## 2020-06-20 PROCEDURE — 93005 ELECTROCARDIOGRAM TRACING: CPT | Performed by: PHYSICIAN ASSISTANT

## 2020-06-20 PROCEDURE — 25010000002 FUROSEMIDE PER 20 MG: Performed by: INTERNAL MEDICINE

## 2020-06-20 PROCEDURE — 99232 SBSQ HOSP IP/OBS MODERATE 35: CPT | Performed by: INTERNAL MEDICINE

## 2020-06-20 PROCEDURE — 25010000002 KETOROLAC TROMETHAMINE PER 15 MG: Performed by: INTERNAL MEDICINE

## 2020-06-20 PROCEDURE — 93010 ELECTROCARDIOGRAM REPORT: CPT | Performed by: INTERNAL MEDICINE

## 2020-06-20 PROCEDURE — 85027 COMPLETE CBC AUTOMATED: CPT | Performed by: PHYSICIAN ASSISTANT

## 2020-06-20 PROCEDURE — 71045 X-RAY EXAM CHEST 1 VIEW: CPT

## 2020-06-20 RX ORDER — FUROSEMIDE 10 MG/ML
40 INJECTION INTRAMUSCULAR; INTRAVENOUS EVERY 12 HOURS
Status: COMPLETED | OUTPATIENT
Start: 2020-06-20 | End: 2020-06-21

## 2020-06-20 RX ORDER — SODIUM CHLORIDE 9 MG/ML
50 INJECTION, SOLUTION INTRAVENOUS CONTINUOUS
Status: DISCONTINUED | OUTPATIENT
Start: 2020-06-20 | End: 2020-06-20

## 2020-06-20 RX ADMIN — SERTRALINE HYDROCHLORIDE 100 MG: 100 TABLET ORAL at 08:07

## 2020-06-20 RX ADMIN — SODIUM CHLORIDE 50 ML/HR: 9 INJECTION, SOLUTION INTRAVENOUS at 08:13

## 2020-06-20 RX ADMIN — KETOROLAC TROMETHAMINE 30 MG: 30 INJECTION, SOLUTION INTRAMUSCULAR at 15:12

## 2020-06-20 RX ADMIN — METOPROLOL TARTRATE 12.5 MG: 25 TABLET, FILM COATED ORAL at 08:07

## 2020-06-20 RX ADMIN — SENNOSIDES AND DOCUSATE SODIUM 2 TABLET: 8.6; 5 TABLET ORAL at 08:07

## 2020-06-20 RX ADMIN — ASPIRIN 325 MG: 325 TABLET, COATED ORAL at 08:07

## 2020-06-20 RX ADMIN — ATORVASTATIN CALCIUM 40 MG: 40 TABLET, FILM COATED ORAL at 20:57

## 2020-06-20 RX ADMIN — SENNOSIDES AND DOCUSATE SODIUM 2 TABLET: 8.6; 5 TABLET ORAL at 20:57

## 2020-06-20 RX ADMIN — METOPROLOL TARTRATE 12.5 MG: 25 TABLET, FILM COATED ORAL at 20:57

## 2020-06-20 RX ADMIN — CEFUROXIME SODIUM 1.5 G: 1.5 INJECTION, POWDER, FOR SOLUTION INTRAVENOUS at 00:03

## 2020-06-20 RX ADMIN — TAMSULOSIN HYDROCHLORIDE 0.4 MG: 0.4 CAPSULE ORAL at 08:07

## 2020-06-20 RX ADMIN — FUROSEMIDE 40 MG: 10 INJECTION, SOLUTION INTRAMUSCULAR; INTRAVENOUS at 13:22

## 2020-06-20 RX ADMIN — KETOROLAC TROMETHAMINE 30 MG: 30 INJECTION, SOLUTION INTRAMUSCULAR at 02:28

## 2020-06-20 RX ADMIN — SODIUM CHLORIDE, PRESERVATIVE FREE 10 ML: 5 INJECTION INTRAVENOUS at 20:57

## 2020-06-20 NOTE — PLAN OF CARE
Problem: Patient Care Overview  Goal: Plan of Care Review  Outcome: Ongoing (interventions implemented as appropriate)  Flowsheets (Taken 6/20/2020 1103)  Progress: improving  Plan of Care Reviewed With: patient  Outcome Summary: patient is able to increase ambulation to 220 ft with CGA and stable vitals patient is progressing toward mobility goals as expected

## 2020-06-20 NOTE — PLAN OF CARE
Problem: Patient Care Overview  Goal: Plan of Care Review  Outcome: Ongoing (interventions implemented as appropriate)  Flowsheets  Taken 6/20/2020 1835 by Asha Tellez RN  Progress: improving  Taken 6/20/2020 1103 by Yissel Suazo PT  Plan of Care Reviewed With: patient  Note:   Pt doing well, no issues over night. Up to chair and ambulated per PT. Refused second walk. Appetite OK. Pain managed with one dose toradol. Lasix given, diuresed 750. VSS. Plan to d/c MTs and transfer to tele in AM.

## 2020-06-20 NOTE — PROGRESS NOTES
[facility]  IP Progress Note      Chief Complaint/Reason for service: Postop management valvular heart disease    Subjective   Subjective: Patient states he feels better today.  States he is coughing up some phlegm.  His breathing is improved.  He states the primary reason he came in was for shortness of breath not so much chest pain.    Past medical, surgical, social and family history reviewed in the patient's electronic medical record.    Objective     Vital Sign Min/Max for last 24 hours  Temp  Min: 97.6 °F (36.4 °C)  Max: 99.7 °F (37.6 °C)   BP  Min: 94/70  Max: 148/84   Pulse  Min: 67  Max: 81   Resp  Min: 13  Max: 24   SpO2  Min: 81 %  Max: 100 %   Flow (L/min)  Min: 2  Max: 4      Intake/Output Summary (Last 24 hours) at 6/20/2020 0735  Last data filed at 6/20/2020 0600  Gross per 24 hour   Intake 1404 ml   Output 1335 ml   Net 69 ml             Current Facility-Administered Medications:   •  acetaminophen (TYLENOL) tablet 650 mg, 650 mg, Oral, Q4H PRN, Adriana Delacruz PA-C  •  ALPRAZolam (XANAX) tablet 0.25 mg, 0.25 mg, Oral, TID PRN, Adriana Delacruz PA-C, 0.25 mg at 06/19/20 0029  •  aspirin EC tablet 325 mg, 325 mg, Oral, Daily, Adriana Delacruz PA-C, 325 mg at 06/19/20 0815  •  atorvastatin (LIPITOR) tablet 40 mg, 40 mg, Oral, Nightly, Cooper Burden MD, 40 mg at 06/19/20 2058  •  HYDROcodone-acetaminophen (NORCO) 7.5-325 MG per tablet 1 tablet, 1 tablet, Oral, Q4H PRN, Cooper Burden MD, 1 tablet at 06/19/20 0834  •  insulin lispro (humaLOG) injection 0-7 Units, 0-7 Units, Subcutaneous, TID AC, Cooper Burden MD  •  ipratropium-albuterol (DUO-NEB) nebulizer solution 3 mL, 3 mL, Nebulization, Q4H PRN, Adriana Delacruz PA-C  •  ketorolac (TORADOL) injection 30 mg, 30 mg, Intravenous, Q8H PRN, Josie Beltran MD, 30 mg at 06/20/20 0228  •  metoprolol tartrate (LOPRESSOR) half tablet 12.5 mg, 12.5 mg, Oral, Q12H, Cooper Burden MD, 12.5 mg  at 06/19/20 2058  •  morphine injection 2 mg, 2 mg, Intravenous, Q2H PRN, Cooper Burden MD  •  niCARdipine (CARDENE) 40 mg in 200 mL NS (0.2 mg/mL) infusion, 5-15 mg/hr, Intravenous, Continuous PRN, Adriana Delacruz PA-C  •  nitroglycerin (TRIDIL) 200 mcg/ml infusion, 5-200 mcg/min, Intravenous, Continuous PRN, Adriana Delacruz PA-C, Stopped at 06/18/20 1500  •  norepinephrine (LEVOPHED) 8 mg/250 mL (32 mcg/mL) in sodium chloride 0.9% infusion (premix), 0.02-0.3 mcg/kg/min, Intravenous, Continuous PRN, Ardiana Delacruz PA-C  •  ondansetron (ZOFRAN) injection 4 mg, 4 mg, Intravenous, Q6H PRN, Adriana Delacruz PA-C, 4 mg at 06/19/20 0102  •  oxyCODONE (ROXICODONE) immediate release tablet 10 mg, 10 mg, Oral, Q4H PRN, Cooper Burden MD  •  phenylephrine (COCO-SYNEPHRINE) 50 mg/250 mL (0.2 mg/mL) in 0.9% NS  infusion, 0.5-3 mcg/kg/min, Intravenous, Continuous PRN, Adriana Delacruz PA-C  •  potassium chloride (MICRO-K) CR capsule 40 mEq, 40 mEq, Oral, PRN **OR** potassium chloride (KLOR-CON) packet 40 mEq, 40 mEq, Oral, PRN, Adriana Delacruz PA-C  •  sennosides-docusate (PERICOLACE) 8.6-50 MG per tablet 2 tablet, 2 tablet, Oral, BID, Cooper Burden MD, 2 tablet at 06/19/20 2058  •  sertraline (ZOLOFT) tablet 100 mg, 100 mg, Oral, Daily, Adriana Delacruz PA-C, 100 mg at 06/19/20 0815  •  sodium chloride 0.9 % flush 10 mL, 10 mL, Intravenous, Q12H, Adriana Delacruz PA-C, 10 mL at 06/19/20 2059  •  sodium chloride 0.9 % flush 10 mL, 10 mL, Intravenous, PRN, Adriana Delacruz PA-C, 10 mL at 06/18/20 0638  •  tamsulosin (FLOMAX) 24 hr capsule 0.4 mg, 0.4 mg, Oral, Daily, Adriana Delacruz PA-C, 0.4 mg at 06/19/20 0814    Physical Exam: General well-developed well-nourished white male being in a recliner able to move quite well with no dyspnea or tachypnea        HEENT: No JVP is present, central line present in the right neck       Respiratory: Equal bilateral symmetrical expansion overall clear auscultation on  the left, isolated crackles in the right       Cardiovascular: Regular rate and rhythm with a rub and no edema to palpation       GI: Obese with positive bowel sounds       Lower Extremities: No lesions       Neuro: Facial expressions are symmetrical moves all 4 extremities       Skin: Warm and dry no edema to palpation       Psych: Pleasant affect    Results Review: Intake exceeds output overnight by 70 mL's.  I personally reviewed the chest x-ray which shows cardiomegaly with atelectasis or effusions bilaterally worse on the left than the right EKG    Radiology Results:  Imaging Results (Last 72 Hours)     Procedure Component Value Units Date/Time    XR Chest 1 View [[id number]] Resulted:  06/20/20 0617     Updated:  06/20/20 0618    XR Chest 1 View [[id number]] Collected:  06/19/20 0827     Updated:  06/19/20 2119    Narrative:       EXAMINATION: XR CHEST 1 VW-06/19/2020:     INDICATION: Post-Op Heart Surgery; I34.0-Nonrheumatic mitral (valve)  insufficiency; R94.39-Abnormal result of other cardiovascular function  study.     COMPARISON: 06/18/2020.     FINDINGS: ET tube and NG tube have been removed. PA catheter has been  pulled back to the SVC. The heart is enlarged. The vasculature appears  upper limits of normal. There is mild, improving left basilar  atelectasis and no evidence of pneumothorax.       Impression:       Mild remaining left basilar atelectasis. No pneumothorax or  other new chest disease is seen.     D:  06/19/2020  E:  06/19/2020            This report was finalized on 6/19/2020 9:16 PM by Dr. Mariano Jj MD.       XR Chest 1 View [[id number]] Collected:  06/18/20 1135     Updated:  06/18/20 1651    Narrative:          EXAMINATION: XR CHEST 1 VW-06/18/2020:      INDICATION: Tube repositioning; I34.0-Nonrheumatic mitral (valve)  insufficiency; R94.39-Abnormal result of other cardiovascular function  study.      COMPARISON: Chest x-ray dated 06/18/2020.     FINDINGS: Interval repositioning of the  endotracheal tube now  approximately 2.5 cm above the level of the rodney. Support hardware  otherwise unchanged. No pneumothorax or pleural effusion.       Impression:       Interval repositioning of the endotracheal tube now  approximately 2.5 cm above the level the rodney. Support hardware  otherwise unchanged. No pneumothorax or pleural effusion.     D:  06/18/2020  E:  06/18/2020     This report was finalized on 6/18/2020 4:47 PM by Dr. Nash Tellez.       XR Chest 1 View [[id number]] Collected:  06/18/20 1044     Updated:  06/18/20 1650    Narrative:          EXAMINATION: XR CHEST 1 VW-06/18/2020:      INDICATION: Post-Op Check Line & Tube Placement; I34.0-Nonrheumatic  mitral (valve) insufficiency; R94.39-Abnormal result of other  cardiovascular function study.      COMPARISON: Chest x-ray dated 06/16/2020.     FINDINGS: Postoperative appearance of the chest status post median  sternotomy with endotracheal tube below the level of the rodney in the  right mainstem bronchus of a right mainstem intubation. Esophagogastric  tube courses below the diaphragm and out of the field-of-view.  Mediastinal and pleural drains in place. Right internal jugular  pulmonary arterial catheter is likely coiled within the left main  pulmonary artery. No pneumothorax or large effusion.       Impression:       Postoperative appearance of the chest status post median  sternotomy with endotracheal tube below the level of the rodney in the  right mainstem bronchus of a right mainstem intubation with  repositioning recommended. Esophagogastric tube courses below the  diaphragm and out of the field-of-view. Mediastinal and pleural drains  in place. Right internal jugular pulmonary arterial catheter is likely  coiled within the left main pulmonary artery.     D:  06/18/2020  E:  06/18/2020     This report was finalized on 6/18/2020 4:47 PM by Dr. Nash Tellez.       XR Chest 1 View [[id number]] Collected:  06/17/20 1128     Updated:   06/17/20 1602    Narrative:          EXAMINATION: XR CHEST 1 VW-06/16/2020:      INDICATION: Preop.      COMPARISON: NONE.     FINDINGS: Portable chest reveals cardiac and mediastinal silhouettes  within normal limits. The lung fields are grossly clear. No focal  parenchymal opacification present. No pleural effusion or pneumothorax.  The bony structures are unremarkable. Pulmonary vascularity is within  normal limits.           Impression:       No acute cardiopulmonary disease.     D:  06/17/2020  E:  06/17/2020     This report was finalized on 6/17/2020 3:59 PM by Dr. Brittanie Mistry MD.             EKG: Sinus rhythm mild ST segment elevation and essentially no changes from yesterday    ECHO: None available    Tele: Sinus rhythm    Assessment   Assessment/Plan: 1 valvular heart disease-patient is status post a tissue MVR.  He is doing quite well with no signs or symptoms of heart failure.  2 postop anemia-continue to trend  3 hyponatremia-the patient denies significant water intake.  We will put him on some low-dose normal saline 50 ml/hr    Cooper Puente MD  06/20/20  07:35

## 2020-06-20 NOTE — PROGRESS NOTES
"Rakan Boone  [id number]  1960     LOS: 3 days   Patient Care Team:  Provider, No Known as PCP - General    Chief Complaint: Mitral regurgitation      Subjective: No specific complaints    Objective:     Vital Sign Min/Max for last 24 hours  Temp  Min: 97.6 °F (36.4 °C)  Max: 99.7 °F (37.6 °C)   BP  Min: 94/70  Max: 148/84   Pulse  Min: 67  Max: 81   Resp  Min: 13  Max: 24   SpO2  Min: 81 %  Max: 100 %   No data recorded   No data recorded     Flowsheet Rows      First Filed Value   Admission Height  170.2 cm (67\") Documented at 06/16/2020 1130   Admission Weight  84.3 kg (185 lb 14.4 oz) Documented at 06/16/2020 1130          Physical Exam:    Wound: Satisfactory    Pulses:     Mediastinal and Chest Tube Drainage:       Results Review:   Results from last 7 days   Lab Units 06/20/20  0322   WBC 10*3/mm3 6.00   HEMOGLOBIN g/dL 8.5*   HEMATOCRIT % 27.8*   PLATELETS 10*3/mm3 109*     Results from last 7 days   Lab Units 06/20/20  0322   SODIUM mmol/L 129*   POTASSIUM mmol/L 4.5   CHLORIDE mmol/L 94*   CO2 mmol/L 24.0   BUN mg/dL 21   CREATININE mg/dL 1.03   GLUCOSE mg/dL 121*   CALCIUM mg/dL 8.6     Results from last 7 days   Lab Units 06/18/20  1539   PH, ARTERIAL pH units 7.373   PO2 ART mm Hg 82.0*   PCO2, ARTERIAL mm Hg 46.5*   HCO3 ART mmol/L 27.0*         Assessment      Severe mitral valve regurgitation    Hypertension    Dyslipidemia    BPH     Anxiety and depression    Chewing tobacco use      Doing satisfactory.        Shamar Orozco MD  06/20/20  06:53      Please note that portions of this note were completed with a voice recognition program. Efforts were made to edit the dictations, but words may be mistranscribed  "

## 2020-06-20 NOTE — PLAN OF CARE
Problem: Patient Care Overview  Goal: Plan of Care Review  Outcome: Ongoing (interventions implemented as appropriate)  Flowsheets (Taken 6/20/2020 8481)  Progress: improving  Plan of Care Reviewed With: patient  Note:   Neuro- A/O. Pt ambulated well with some SOA. Resp- Sats>94 on 2.5L NC. MT output: (1/2)- 100mL (3/4)- 120mL. Cardiac- NSR. Vwires in place but not used. GI- +BS, no BM. - UOP: 695mL. Will continue to monitor in ICU.

## 2020-06-20 NOTE — THERAPY TREATMENT NOTE
Patient Name: Rakan Boone  : 1960    MRN: [id number]                              Today's Date: 2020       Admit Date: 2020    Visit Dx:     ICD-10-CM ICD-9-CM   1. Mitral valve insufficiency, unspecified etiology I34.0 424.0   2. Abnormal stress test R94.39 794.39     Patient Active Problem List   Diagnosis   • Severe mitral valve regurgitation   • Hypertension   • Dyslipidemia   • BPH    • Anxiety and depression   • Chewing tobacco use     Past Medical History:   Diagnosis Date   • Anxiety    • Depression    • HLD (hyperlipidemia)    • Hypertension      Past Surgical History:   Procedure Laterality Date   • ANKLE SURGERY     • ARM AMPUTATION     • CARDIAC CATHETERIZATION N/A 2020    Procedure: Left Heart Cath 33335 C19  @ [facility];  Surgeon: Refugio Goldstein MD;  Location:  [facility] CATH [facility]VASIVE LOCATION;  Service: Cardiovascular;  Laterality: N/A;   • CORONARY ANGIOPLASTY WITH STENT PLACEMENT     • MITRAL VALVE REPAIR/REPLACEMENT N/A 2020    Procedure: MITRAL VALVEREPLACEMENT, MITRAL CLIP, TRANSESOPHAGEAL ECHOCARDIOGRAM;  Surgeon: Cooper Burden MD;  Location:  [facility];  Service: Cardiothoracic;  Laterality: N/A;   • ROTATOR CUFF REPAIR       General Information     Row Name 20 1101          PT Evaluation Time/Intention    Mode of Treatment  physical therapy  -KADE     Row Name 20 1101          General Information    Patient Profile Reviewed?  yes  -KADE     Prior Level of Function  independent:;gait;transfer;bed mobility;ADL's  -KADE     Existing Precautions/Restrictions  cardiac;oxygen therapy device and L/min;sternal  -KADE     Barriers to Rehab  none identified  -KADE     Row Name 20 1101          Relationship/Environment    Lives With  spouse  -KADE     Row Name 20 1101          Resource/Environmental Concerns    Current Living Arrangements  home/apartment/condo  -KADE     Row Name 20 1101          Cognitive Assessment/Intervention- PT/OT     Orientation Status (Cognition)  oriented x 4  -KADE     Row Name 06/20/20 1101          Safety Issues, Functional Mobility    Safety Issues Affecting Function (Mobility)  safety precautions follow-through/compliance;safety precaution awareness  -KADE     Impairments Affecting Function (Mobility)  balance;endurance/activity tolerance;shortness of breath;strength  -KADE       User Key  (r) = Recorded By, (t) = Taken By, (c) = Cosigned By    Initials Name Provider Type    Yissel Goss PT Physical Therapist        Mobility     Row Name 06/20/20 1102          Bed Mobility Assessment/Treatment    Bed Mobility Assessment/Treatment  rolling left;rolling right;scooting/bridging;sit-supine  -KADE     Rolling Left [place] (Bed Mobility)  minimum assist (75% patient effort)  -KADE     Rolling Right [place] (Bed Mobility)  minimum assist (75% patient effort)  -KADE     Scooting/Bridging [place] (Bed Mobility)  independent  -KADE     Sit-Supine [place] (Bed Mobility)  minimum assist (75% patient effort)  -KADE     Comment (Bed Mobility)  cues for sternal precautions  -KADE     Row Name 06/20/20 1102          Bed-Chair Transfer    Bed-Chair [place] (Transfers)  contact guard  -KADE     Row Name 06/20/20 1102          Sit-Stand Transfer    Sit-Stand [place] (Transfers)  contact guard  -KADE     Row Name 06/20/20 1102          Gait/Stairs Assessment/Training    Gait/Stairs Assessment/Training  gait/ambulation independence  -KADE     [place] Level (Gait)  contact guard  -KADE     Distance in Feet (Gait)  220  -KADE     Pattern (Gait)  step-through  -KADE     Comment (Gait/Stairs)  good gait pattern  -KADE       User Key  (r) = Recorded By, (t) = Taken By, (c) = Cosigned By    Initials Name Provider Type    Yissel Goss PT Physical Therapist        Obj/Interventions     Row Name 06/20/20 1103          Therapeutic Exercise    Lower Extremity Range of Motion (Therapeutic Exercise)  hip flexion/extension,  bilateral;knee flexion/extension, bilateral;ankle dorsiflexion/plantar flexion, bilateral  -KADE     Exercise Type (Therapeutic Exercise)  AROM (active range of motion)  -KADE     Position (Therapeutic Exercise)  seated  -KADE     Sets/Reps (Therapeutic Exercise)  1/10  -KADE     Expected Outcome (Therapeutic Exercise)  facilitate normal movement patterns;improve functional tolerance, self-care activity  -KADE     Row Name 06/20/20 1103          Static Sitting Balance    Level of [place] (Unsupported Sitting, Static Balance)  independent  -KADE     Sitting Position (Unsupported Sitting, Static Balance)  sitting on edge of bed  -KADE     Row Name 06/20/20 1103          Dynamic Sitting Balance    Level of [place], Reaches Outside Midline (Sitting, Dynamic Balance)  independent  -KADE     Sitting Position, Reaches Outside Midline (Sitting, Dynamic Balance)  sitting on edge of bed  -KADE     Row Name 06/20/20 1103          Static Standing Balance    Level of [place] (Supported Standing, Static Balance)  contact guard assist  -KADE     Row Name 06/20/20 1103          Dynamic Standing Balance    Level of [place], Reaches Outside Midline (Standing, Dynamic Balance)  contact guard assist  -KADE       User Key  (r) = Recorded By, (t) = Taken By, (c) = Cosigned By    Initials Name Provider Type    Yissel Goss, PT Physical Therapist        Goals/Plan    No documentation.       Clinical Impression     Row Name 06/20/20 1103          Pain Scale: Numbers Pre/Post-Treatment    Pain Scale: Numbers, Pretreatment  2/10  -KADE     Pain Scale: Numbers, Post-Treatment  2/10  -KADE     Pain Location - Orientation  incisional  -KADE     Pain Location  chest  -KADE     Pain Intervention(s)  Repositioned;Ambulation/increased activity;Splinting  -KADE     Row Name 06/20/20 1103          Plan of Care Review    Plan of Care Reviewed With  patient  -KADE     Progress  improving  -KADE     Outcome Summary  patient is able to increase ambulation to 220  ft with CGA and stable vitals patient is progressing toward mobility goals as expected  -KADE     Row Name 06/20/20 1103          Physical Therapy Clinical Impression    Patient/Family Goals Statement (PT Clinical Impression)  return to PLOF  -KADE     Criteria for Skilled Interventions Met (PT Clinical Impression)  yes;treatment indicated  -KADE     Rehab Potential (PT Clinical Summary)  good, to achieve stated therapy goals  -KADE     Row Name 06/20/20 1103          Vital Signs    Pre Systolic BP Rehab  114  -KADE     Pre Treatment Diastolic BP  74  -KADE     Post Systolic BP Rehab  124  -KADE     Post Treatment Diastolic BP  74  -KADE     Pretreatment Heart Rate (beats/min)  73  -KADE     Posttreatment Heart Rate (beats/min)  78  -KADE     Pre SpO2 (%)  95  -KADE     O2 Delivery Pre Treatment  nasal cannula  -KADE     Post SpO2 (%)  98  -KADE     O2 Delivery Post Treatment  nasal cannula  -KADE     Pre Patient Position  Sitting  -KADE     Intra Patient Position  Standing  -KADE     Post Patient Position  Supine  -KADE     Row Name 06/20/20 1103          Positioning and Restraints    Pre-Treatment Position  sitting in chair/recliner  -KADE     Post Treatment Position  bed  -KADE     In Bed  notified nsg;supine;call light within reach;encouraged to call for assist  -KADE       User Key  (r) = Recorded By, (t) = Taken By, (c) = Cosigned By    Initials Name Provider Type    KADE Yissel Suazo, PT Physical Therapist        Outcome Measures     Row Name 06/20/20 1106          How much help from another person do you currently need...    Turning from your back to your side while in flat bed without using bedrails?  3  -KADE     Moving from lying on back to sitting on the side of a flat bed without bedrails?  3  -KADE     Moving to and from a bed to a chair (including a wheelchair)?  3  -KADE     Standing up from a chair using your arms (e.g., wheelchair, bedside chair)?  3  -KADE     Climbing 3-5 steps with a railing?  3  -KADE     To walk in hospital room?  3  -KADE      AM-PAC 6 Clicks Score (PT)  18  -KADE       User Key  (r) = Recorded By, (t) = Taken By, (c) = Cosigned By    Initials Name Provider Type    Yissel Goss PT Physical Therapist        Physical Therapy Education                 Title: PT OT SLP Therapies (In Progress)     Topic: Physical Therapy (In Progress)     Point: Mobility training (In Progress)     Description:   Instruct learner(s) on safety and technique for assisting patient out of bed, chair or wheelchair.  Instruct in the proper use of assistive devices, such as walker, crutches, cane or brace.              Patient Friendly Description:   It's important to get you on your feet again, but we need to do so in a way that is safe for you. Falling has serious consequences, and your personal safety is the most important thing of all.        When it's time to get out of bed, one of us or a family member will sit next to you on the bed to give you support.     If your doctor or nurse tells you to use a walker, crutches, a cane, or a brace, be sure you use it every time you get out of bed, even if you think you don't need it.    Learning Progress Summary           Patient Acceptance, E, NR by KADE at 6/20/2020 0950    Acceptance, E,D, NR by  at 6/19/2020 0829                   Point: Home exercise program (In Progress)     Description:   Instruct learner(s) on appropriate technique for monitoring, assisting and/or progressing patient with therapeutic exercises and activities.              Learning Progress Summary           Patient Acceptance, E, NR by KADE at 6/20/2020 0950                   Point: Body mechanics (In Progress)     Description:   Instruct learner(s) on proper positioning and spine alignment for patient and/or caregiver during mobility tasks and/or exercises.              Learning Progress Summary           Patient Acceptance, E, NR by KADE at 6/20/2020 0950    Acceptance, E,D, NR by  at 6/19/2020 0829                   Point: Precautions  (In Progress)     Description:   Instruct learner(s) on prescribed precautions during mobility and gait tasks              Learning Progress Summary           Patient Acceptance, E, NR by KADE at 6/20/2020 0950    Acceptance, E,D, NR by  at 6/19/2020 0829                               User Key     Initials Effective Dates Name Provider Type Discipline    KADE 06/19/15 -  Yissel Suazo PT Physical Therapist PT     03/19/20 -  Sabrina Ortiz, JEANNETTE Student PT Student PT              PT Recommendation and Plan  Planned Therapy Interventions (PT Eval): balance training, bed mobility training, gait training, transfer training, strengthening, home exercise program  Outcome Summary/Treatment Plan (PT)  Anticipated Discharge Disposition (PT): home with assist  Plan of Care Reviewed With: patient  Progress: improving  Outcome Summary: patient is able to increase ambulation to 220 ft with CGA and stable vitals patient is progressing toward mobility goals as expected     Time Calculation:   PT Charges     Row Name 06/20/20 0950             Time Calculation    Start Time  0950  -KADE      PT Received On  06/20/20  -      PT Goal Re-Cert Due Date  06/29/20  -         Time Calculation- PT    Total Timed Code Minutes- PT  23 minute(s)  -KADE         Timed Charges    78594 - PT Therapeutic Activity Minutes  23  -KADE        User Key  (r) = Recorded By, (t) = Taken By, (c) = Cosigned By    Initials Name Provider Type    Yissel Goss, PT Physical Therapist        Therapy Charges for Today     Code Description Service Date Service Provider Modifiers Qty    [id number]  PT THERAPEUTIC ACT EA 15 MIN 6/20/2020 Yissel Suazo, PT GP 2          PT G-Codes  Outcome Measure Options: AM-PAC 6 Clicks Basic Mobility (PT)  AM-PAC 6 Clicks Score (PT): 18    Yissel Suazo, JEANNETTE  6/20/2020

## 2020-06-20 NOTE — PROGRESS NOTES
Intensive Care Follow-up      LOS: 3 days     Mr. Rakan Boone, 59 y.o. male is followed for: Glycemic, Electrolyte, Respiratory, and Medical management     Subjective - Interval History     59-year-old gentleman with hypertension, dyslipidemia, coronary disease with previous stent in 2017 presenting with worsening exertional dyspnea.  Evaluation revealed severe mitral regurgitation.  He had a previous history of a stent to his LAD in June 2016.  He does chew tobacco.  June 18 he underwent mitral valve replacement.  He had no immediate complications and did not require blood products.  He did have a right mainstem intubation with withdrawal of the endotracheal tube 3 cm in the immediate ICU course.  His [place]-Jimmy catheter was coiled in the pulmonary artery and ultimately was removed.    Interval history    Chest tubes remain in place due to moderate output  Oxygenating adequately on 2 L/min via nasal cannula  On no continuous infusions  Serum sodium low     The patient's relevant past medical, surgical and social history were reviewed and updated in Epic as appropriate.     Objective     Infusions:    niCARdipine 5-15 mg/hr    nitroglycerin 5-200 mcg/min Last Rate: Stopped (06/18/20 1500)   norepinephrine 0.02-0.3 mcg/kg/min    phenylephrine 0.5-3 mcg/kg/min      Medications:    aspirin 325 mg Oral Daily   atorvastatin 40 mg Oral Nightly   furosemide 40 mg Intravenous Q12H   insulin lispro 0-7 Units Subcutaneous TID AC   metoprolol tartrate 12.5 mg Oral Q12H   sennosides-docusate 2 tablet Oral BID   sertraline 100 mg Oral Daily   sodium chloride 10 mL Intravenous Q12H   tamsulosin 0.4 mg Oral Daily     Intake/Output       06/19/20 0700 - 06/20/20 0659 06/20/20 0700 - 06/21/20 0659    Intake (ml) 1404 250    Output (ml) 1335 245    Net (ml) 69 5        Vital Sign Min/Max for last 24 hours  Temp  Min: 97.6 °F (36.4 °C)  Max: 99.3 °F (37.4 °C)   BP  Min: 94/70  Max: 148/84   Pulse  Min: 67  Max: 81   Resp  Min: 13   Max: 24   SpO2  Min: 81 %  Max: 100 %   No data recorded        Physical Exam:   GENERAL: Awake, no distress   HEENT: No adenopathy or thyromegaly.  Right internal jugular introducer site okay   LUNGS: Decreased breath sounds without wheezes   HEART: Regular rate and rhythm.  Sternal incision without drainage or dehiscence   GI: Soft, nontender   EXTREMITIES: Trace lower extremity edema.  No clubbing or cyanosis   NEURO/PSYCH: Awake, alert, appropriate    Results from last 7 days   Lab Units 06/20/20  0322 06/19/20  0313 06/18/20  1405   WBC 10*3/mm3 6.00 9.37 6.86   HEMOGLOBIN g/dL 8.5* 9.0* 9.4*   PLATELETS 10*3/mm3 109* 140 153     Results from last 7 days   Lab Units 06/20/20  0322 06/19/20  0313 06/18/20  1405 06/18/20  1016   SODIUM mmol/L 129* 131* 140 138   POTASSIUM mmol/L 4.5 4.5 4.4 4.7   CO2 mmol/L 24.0 24.0 25.0 25.0   BUN mg/dL 21 18 16 14   CREATININE mg/dL 1.03 1.12 1.18 0.95   MAGNESIUM mg/dL  --  2.5* 2.8* 3.2*   PHOSPHORUS mg/dL  --  4.9* 3.4 3.1   GLUCOSE mg/dL 121* 126* 141* 123*     Estimated Creatinine Clearance: 79.3 mL/min (by C-G formula based on SCr of 1.03 mg/dL).    Results from last 7 days   Lab Units 06/16/20  1830   HEMOGLOBIN A1C % 4.90       Results from last 7 days   Lab Units 06/18/20  1539   PH, ARTERIAL pH units 7.373   PCO2, ARTERIAL mm Hg 46.5*   PO2 ART mm Hg 82.0*     No results found for: LACTATE       Images: Chest x-ray reveals marked cardiomegaly without consolidation or effusions    I reviewed the patient's results and images.     Impression      Active Hospital Problems    Diagnosis   • **Severe mitral valve regurgitation (S/P MVR)     [facility] 6/16/20:   -Patent LAD stent  -Normal LV systolic function  -Severe mitral valve regurgitation     • Hypertension   • Dyslipidemia   • BPH    • Anxiety and depression   • Chewing tobacco use         Plan        Will hold IV fluids and give a couple doses of Lasix for hyponatremia  Mobilize  Follow-up renal function and electrolytes    Plan of care and goals reviewed with Multidisciplinary Team and Antibiotic Stewardship rounds   I discussed the patient's findings and my recommendations with patient and nursing staff      .     TANNER Weathers MD  Pulmonary and Critical Care Medicine

## 2020-06-21 LAB
ALBUMIN SERPL-MCNC: 4 G/DL (ref 3.5–5.2)
ALBUMIN/GLOB SERPL: 1.7 G/DL
ALP SERPL-CCNC: 62 U/L (ref 39–117)
ALT SERPL W P-5'-P-CCNC: 11 U/L (ref 1–41)
ANION GAP SERPL CALCULATED.3IONS-SCNC: 14 MMOL/L (ref 5–15)
AST SERPL-CCNC: 24 U/L (ref 1–40)
BILIRUB SERPL-MCNC: 1 MG/DL (ref 0.2–1.2)
BUN BLD-MCNC: 21 MG/DL (ref 8–23)
BUN/CREAT SERPL: 22.6 (ref 7–25)
CALCIUM SPEC-SCNC: 8.7 MG/DL (ref 8.6–10.5)
CHLORIDE SERPL-SCNC: 93 MMOL/L (ref 98–107)
CO2 SERPL-SCNC: 27 MMOL/L (ref 22–29)
CREAT BLD-MCNC: 0.93 MG/DL (ref 0.76–1.27)
DEPRECATED RDW RBC AUTO: 43.6 FL (ref 37–54)
ERYTHROCYTE [DISTWIDTH] IN BLOOD BY AUTOMATED COUNT: 14.1 % (ref 12.3–15.4)
GFR SERPL CREATININE-BSD FRML MDRD: 83 ML/MIN/1.73
GLOBULIN UR ELPH-MCNC: 2.4 GM/DL
GLUCOSE BLD-MCNC: 99 MG/DL (ref 65–99)
HCT VFR BLD AUTO: 27.5 % (ref 37.5–51)
HGB BLD-MCNC: 8.5 G/DL (ref 13–17.7)
MCH RBC QN AUTO: 26.1 PG (ref 26.6–33)
MCHC RBC AUTO-ENTMCNC: 30.9 G/DL (ref 31.5–35.7)
MCV RBC AUTO: 84.4 FL (ref 79–97)
PLATELET # BLD AUTO: 125 10*3/MM3 (ref 140–450)
PMV BLD AUTO: 12.5 FL (ref 6–12)
POTASSIUM BLD-SCNC: 4 MMOL/L (ref 3.5–5.2)
PROT SERPL-MCNC: 6.4 G/DL (ref 6–8.5)
RBC # BLD AUTO: 3.26 10*6/MM3 (ref 4.14–5.8)
SODIUM BLD-SCNC: 134 MMOL/L (ref 136–145)
WBC NRBC COR # BLD: 5.37 10*3/MM3 (ref 3.4–10.8)

## 2020-06-21 PROCEDURE — 25010000002 KETOROLAC TROMETHAMINE PER 15 MG: Performed by: INTERNAL MEDICINE

## 2020-06-21 PROCEDURE — 99232 SBSQ HOSP IP/OBS MODERATE 35: CPT | Performed by: INTERNAL MEDICINE

## 2020-06-21 PROCEDURE — 97530 THERAPEUTIC ACTIVITIES: CPT

## 2020-06-21 PROCEDURE — 25010000002 FUROSEMIDE PER 20 MG: Performed by: INTERNAL MEDICINE

## 2020-06-21 PROCEDURE — 80053 COMPREHEN METABOLIC PANEL: CPT | Performed by: INTERNAL MEDICINE

## 2020-06-21 PROCEDURE — 85027 COMPLETE CBC AUTOMATED: CPT | Performed by: PHYSICIAN ASSISTANT

## 2020-06-21 RX ADMIN — SENNOSIDES AND DOCUSATE SODIUM 2 TABLET: 8.6; 5 TABLET ORAL at 08:13

## 2020-06-21 RX ADMIN — SODIUM CHLORIDE, PRESERVATIVE FREE 10 ML: 5 INJECTION INTRAVENOUS at 20:06

## 2020-06-21 RX ADMIN — TAMSULOSIN HYDROCHLORIDE 0.4 MG: 0.4 CAPSULE ORAL at 08:12

## 2020-06-21 RX ADMIN — FUROSEMIDE 40 MG: 10 INJECTION, SOLUTION INTRAMUSCULAR; INTRAVENOUS at 00:31

## 2020-06-21 RX ADMIN — METOPROLOL TARTRATE 25 MG: 25 TABLET, FILM COATED ORAL at 21:20

## 2020-06-21 RX ADMIN — SERTRALINE HYDROCHLORIDE 100 MG: 100 TABLET ORAL at 08:12

## 2020-06-21 RX ADMIN — ASPIRIN 325 MG: 325 TABLET, COATED ORAL at 08:12

## 2020-06-21 RX ADMIN — KETOROLAC TROMETHAMINE 30 MG: 30 INJECTION, SOLUTION INTRAMUSCULAR at 04:33

## 2020-06-21 RX ADMIN — METOPROLOL TARTRATE 25 MG: 25 TABLET, FILM COATED ORAL at 08:13

## 2020-06-21 RX ADMIN — SODIUM CHLORIDE, PRESERVATIVE FREE 10 ML: 5 INJECTION INTRAVENOUS at 08:16

## 2020-06-21 RX ADMIN — ATORVASTATIN CALCIUM 40 MG: 40 TABLET, FILM COATED ORAL at 20:06

## 2020-06-21 RX ADMIN — SENNOSIDES AND DOCUSATE SODIUM 2 TABLET: 8.6; 5 TABLET ORAL at 20:06

## 2020-06-21 RX ADMIN — OXYCODONE 10 MG: 5 TABLET ORAL at 20:06

## 2020-06-21 RX ADMIN — HYDROCODONE BITARTRATE AND ACETAMINOPHEN 1 TABLET: 7.5; 325 TABLET ORAL at 17:29

## 2020-06-21 NOTE — PROGRESS NOTES
Intensive Care Follow-up      LOS: 4 days     Mr. Rakan Boone, 59 y.o. male is followed for: Glycemic, Electrolyte, Respiratory, and Medical management     Subjective - Interval History     59-year-old gentleman with hypertension, dyslipidemia, coronary disease with previous stent in 2017 presenting with worsening exertional dyspnea.  Evaluation revealed severe mitral regurgitation.  He had a previous history of a stent to his LAD in June 2016.  He does chew tobacco.  June 18 he underwent mitral valve replacement.  He had no immediate complications and did not require blood products.  He did have a right mainstem intubation with withdrawal of the endotracheal tube 3 cm in the immediate ICU course.  His [place]-Jimmy catheter was coiled in the pulmonary artery and ultimately was removed.     Interval history     Oxygenating adequately on room air  On no continuous infusions  MT output has decreased    The patient's relevant past medical, surgical and social history were reviewed and updated in Epic as appropriate.     Objective     Infusions:    niCARdipine 5-15 mg/hr    nitroglycerin 5-200 mcg/min Last Rate: Stopped (06/18/20 1500)   norepinephrine 0.02-0.3 mcg/kg/min    phenylephrine 0.5-3 mcg/kg/min      Medications:    aspirin 325 mg Oral Daily   atorvastatin 40 mg Oral Nightly   metoprolol tartrate 25 mg Oral Q12H   sennosides-docusate 2 tablet Oral BID   sertraline 100 mg Oral Daily   sodium chloride 10 mL Intravenous Q12H   tamsulosin 0.4 mg Oral Daily     Intake/Output       06/20/20 0700 - 06/21/20 0659 06/21/20 0700 - 06/22/20 0659    Intake (ml) 839 --    Output (ml) 3930 200    Net (ml) -3091 -200        Vital Sign Min/Max for last 24 hours  Temp  Min: 98 °F (36.7 °C)  Max: 99 °F (37.2 °C)   BP  Min: 89/58  Max: 174/48   Pulse  Min: 75  Max: 109   Resp  Min: 16  Max: 22   SpO2  Min: 95 %  Max: 100 %   No data recorded        Physical Exam:   GENERAL: Awake, no distress   HEENT: Right IJ introducer site  okay, no adenopathy   LUNGS: No wheezes or rhonchi   HEART: Regular rate and rhythm.  Sternal incision without drainage or dehiscence.  MTs without air leak   GI: Soft, nontender   EXTREMITIES: No clubbing, cyanosis, or edema   NEURO/PSYCH: Awake, alert, appropriate    Results from last 7 days   Lab Units 06/21/20  0334 06/20/20  0322 06/19/20  0313   WBC 10*3/mm3 5.37 6.00 9.37   HEMOGLOBIN g/dL 8.5* 8.5* 9.0*   PLATELETS 10*3/mm3 125* 109* 140     Results from last 7 days   Lab Units 06/21/20  0334 06/20/20  0322 06/19/20  0313 06/18/20  1405 06/18/20  1016   SODIUM mmol/L 134* 129* 131* 140 138   POTASSIUM mmol/L 4.0 4.5 4.5 4.4 4.7   CO2 mmol/L 27.0 24.0 24.0 25.0 25.0   BUN mg/dL 21 21 18 16 14   CREATININE mg/dL 0.93 1.03 1.12 1.18 0.95   MAGNESIUM mg/dL  --   --  2.5* 2.8* 3.2*   PHOSPHORUS mg/dL  --   --  4.9* 3.4 3.1   GLUCOSE mg/dL 99 121* 126* 141* 123*     Estimated Creatinine Clearance: 87.8 mL/min (by C-G formula based on SCr of 0.93 mg/dL).    Results from last 7 days   Lab Units 06/16/20  1830   HEMOGLOBIN A1C % 4.90       Results from last 7 days   Lab Units 06/18/20  1539   PH, ARTERIAL pH units 7.373   PCO2, ARTERIAL mm Hg 46.5*   PO2 ART mm Hg 82.0*     No results found for: LACTATE       Images: Chest x-ray reveals some improvement in atelectasis    I reviewed the patient's results and images.     Impression      Active Hospital Problems    Diagnosis   • **Severe mitral valve regurgitation (S/P MVR)     [facility] 6/16/20:   -Patent LAD stent  -Normal LV systolic function  -Severe mitral valve regurgitation     • Hypertension   • Dyslipidemia   • BPH    • Anxiety and depression   • Chewing tobacco use         Plan        Probable MT removal today  Mobilize  On transfer to the floor   Plan of care and goals reviewed with Multidisciplinary Team and Antibiotic Stewardship rounds   I discussed the patient's findings and my recommendations with patient and nursing staff      .     TANNER Weathers,  MD  Pulmonary and Critical Care Medicine

## 2020-06-21 NOTE — PROGRESS NOTES
"Rakan Boone  [id number]  1960     LOS: 4 days   Patient Care Team:  Provider, No Known as PCP - General    Chief Complaint: Mitral regurgitation      Subjective: No complaints    Objective:     Vital Sign Min/Max for last 24 hours  Temp  Min: 98 °F (36.7 °C)  Max: 99 °F (37.2 °C)   BP  Min: 89/58  Max: 174/48   Pulse  Min: 74  Max: 90   Resp  Min: 16  Max: 22   SpO2  Min: 95 %  Max: 100 %   No data recorded   No data recorded     Flowsheet Rows      First Filed Value   Admission Height  170.2 cm (67\") Documented at 06/16/2020 1130   Admission Weight  84.3 kg (185 lb 14.4 oz) Documented at 06/16/2020 1130          Physical Exam:    Wound: Satisfactory    Pulses:     Mediastinal and Chest Tube Drainage:       Results Review:   Results from last 7 days   Lab Units 06/21/20  0334   WBC 10*3/mm3 5.37   HEMOGLOBIN g/dL 8.5*   HEMATOCRIT % 27.5*   PLATELETS 10*3/mm3 125*     Results from last 7 days   Lab Units 06/21/20  0334   SODIUM mmol/L 134*   POTASSIUM mmol/L 4.0   CHLORIDE mmol/L 93*   CO2 mmol/L 27.0   BUN mg/dL 21   CREATININE mg/dL 0.93   GLUCOSE mg/dL 99   CALCIUM mg/dL 8.7     Results from last 7 days   Lab Units 06/18/20  1539   PH, ARTERIAL pH units 7.373   PO2 ART mm Hg 82.0*   PCO2, ARTERIAL mm Hg 46.5*   HCO3 ART mmol/L 27.0*         Assessment      Severe mitral valve regurgitation (S/P MVR)    Hypertension    Dyslipidemia    BPH     Anxiety and depression    Chewing tobacco use      Doing satisfactory.  Transfer to telemetry        Shamar Orozco MD  06/21/20  07:29      Please note that portions of this note were completed with a voice recognition program. Efforts were made to edit the dictations, but words may be mistranscribed  "

## 2020-06-21 NOTE — PLAN OF CARE
Problem: Patient Care Overview  Goal: Plan of Care Review  Outcome: Ongoing (interventions implemented as appropriate)  Flowsheets  Taken 6/21/2020 1748 by Asha Tellez RN  Progress: improving  Taken 6/21/2020 0915 by Yissel Suazo PT  Plan of Care Reviewed With: patient  Note:   Pt waiting on tele bed. Mt's and wires dc'd this AM. VSS. Ambulating well. Appetite is ok. Pain meds just once today. Increased PAC's today, lopressor dose increased per cards, rhythm improved.

## 2020-06-21 NOTE — PLAN OF CARE
Problem: Patient Care Overview  Goal: Plan of Care Review  Outcome: Ongoing (interventions implemented as appropriate)  Flowsheets (Taken 6/21/2020 0911)  Progress: improving  Plan of Care Reviewed With: patient  Note:   Neuro- A/O. Resp- sats> 95 on 2L NC. MT output- 1/2: 0mL  3/4: 20mL. Cardiac- NSR. Vwires remain in place. Adequate UOP. VSS. Will continue to monitor in ICU.

## 2020-06-21 NOTE — THERAPY TREATMENT NOTE
Patient Name: Rakan Boone  : 1960    MRN: [id number]                              Today's Date: 2020       Admit Date: 2020    Visit Dx:     ICD-10-CM ICD-9-CM   1. Mitral valve insufficiency, unspecified etiology I34.0 424.0   2. Abnormal stress test R94.39 794.39     Patient Active Problem List   Diagnosis   • Severe mitral valve regurgitation (S/P MVR)   • Hypertension   • Dyslipidemia   • BPH    • Anxiety and depression   • Chewing tobacco use     Past Medical History:   Diagnosis Date   • Anxiety    • Depression    • HLD (hyperlipidemia)    • Hypertension      Past Surgical History:   Procedure Laterality Date   • ANKLE SURGERY     • ARM AMPUTATION     • CARDIAC CATHETERIZATION N/A 2020    Procedure: Left Heart Cath [id number]  @ [facility];  Surgeon: Refugio Goldstein MD;  Location:  [facility] CATH INVASIVE LOCATION;  Service: Cardiovascular;  Laterality: N/A;   • CORONARY ANGIOPLASTY WITH STENT PLACEMENT     • MITRAL VALVE REPAIR/REPLACEMENT N/A 2020    Procedure: MITRAL VALVEREPLACEMENT, MITRAL CLIP, TRANSESOPHAGEAL ECHOCARDIOGRAM;  Surgeon: Cooper Burden MD;  Location:  [facility] OR;  Service: Cardiothoracic;  Laterality: N/A;   • ROTATOR CUFF REPAIR       General Information     Row Name 20          PT Evaluation Time/Intention    Document Type  therapy note (daily note)  -KADE     Mode of Treatment  physical therapy  -KADE     Row Name 20          General Information    Patient Profile Reviewed?  yes  -KADE     Prior Level of Function  independent:;gait;transfer;bed mobility;ADL's  -KADE     Existing Precautions/Restrictions  cardiac;oxygen therapy device and L/min;sternal  -KADE     Barriers to Rehab  none identified  -KADE     Row Name 20          Relationship/Environment    Lives With  spouse  -KADE     Row Name 20          Resource/Environmental Concerns    Current Living Arrangements  home/apartment/condo  -KADE     Row Name  06/21/20 0913          Cognitive Assessment/Intervention- PT/OT    Orientation Status (Cognition)  oriented x 4  -KADE     Row Name 06/21/20 0913          Safety Issues, Functional Mobility    Safety Issues Affecting Function (Mobility)  safety precautions follow-through/compliance;safety precaution awareness  -KADE     Impairments Affecting Function (Mobility)  balance;endurance/activity tolerance;shortness of breath;strength  -KADE       User Key  (r) = Recorded By, (t) = Taken By, (c) = Cosigned By    Initials Name Provider Type    Yissel Goss PT Physical Therapist        Mobility     Row Name 06/21/20 0914          Bed Mobility Assessment/Treatment    Bed Mobility Assessment/Treatment  rolling left;rolling right;scooting/bridging;sit-supine  -KADE     Rolling Left [place] (Bed Mobility)  contact guard  -KADE     Rolling Right [place] (Bed Mobility)  contact guard  -KADE     Scooting/Bridging [place] (Bed Mobility)  contact guard  -KADE     Sit-Supine [place] (Bed Mobility)  contact guard  -KADE     Row Name 06/21/20 0914          Bed-Chair Transfer    Bed-Chair [place] (Transfers)  contact guard  -KADE     Row Name 06/21/20 0914          Sit-Stand Transfer    Sit-Stand [place] (Transfers)  contact guard  -KADE     Row Name 06/21/20 0914          Gait/Stairs Assessment/Training    Gait/Stairs Assessment/Training  gait/ambulation independence  -KADE     [place] Level (Gait)  contact guard  -KADE     Distance in Feet (Gait)  220  -KADE     Pattern (Gait)  step-through  -KADE     Deviations/Abnormal Patterns (Gait)  stride length decreased  -KADE       User Key  (r) = Recorded By, (t) = Taken By, (c) = Cosigned By    Initials Name Provider Type    Yissel Goss PT Physical Therapist        Obj/Interventions     Row Name 06/21/20 0915          Therapeutic Exercise    Lower Extremity Range of Motion (Therapeutic Exercise)  hip flexion/extension, bilateral;knee flexion/extension,  bilateral;ankle dorsiflexion/plantar flexion, bilateral  -KADE     Exercise Type (Therapeutic Exercise)  AROM (active range of motion)  -KADE     Position (Therapeutic Exercise)  seated  -KADE     Sets/Reps (Therapeutic Exercise)  1/10  -KADE     Expected Outcome (Therapeutic Exercise)  facilitate normal movement patterns;improve functional tolerance, self-care activity;improve functional stability  -KADE     Row Name 06/21/20 0915          Static Sitting Balance    Level of Mobile (Unsupported Sitting, Static Balance)  independent  -KADE     Sitting Position (Unsupported Sitting, Static Balance)  sitting on edge of bed  -KADE     Row Name 06/21/20 0915          Dynamic Sitting Balance    Level of Mobile, Reaches Outside Midline (Sitting, Dynamic Balance)  independent  -KADE     Sitting Position, Reaches Outside Midline (Sitting, Dynamic Balance)  sitting on edge of bed  -KADE     Row Name 06/21/20 0915          Static Standing Balance    Level of Mobile (Supported Standing, Static Balance)  contact guard assist  -KADE     Row Name 06/21/20 0915          Dynamic Standing Balance    Level of Mobile, Reaches Outside Midline (Standing, Dynamic Balance)  contact guard assist  -KADE       User Key  (r) = Recorded By, (t) = Taken By, (c) = Cosigned By    Initials Name Provider Type    Yissel Goss, PT Physical Therapist        Goals/Plan    No documentation.       Clinical Impression     Row Name 06/21/20 0915          Pain Scale: Numbers Pre/Post-Treatment    Pain Scale: Numbers, Pretreatment  2/10  -KADE     Pain Scale: Numbers, Post-Treatment  2/10  -KADE     Pain Location - Orientation  incisional  -KADE     Pain Location  chest  -KADE     Pain Intervention(s)  Repositioned;Ambulation/increased activity;Splinting  -KADE     Row Name 06/21/20 0915          Plan of Care Review    Plan of Care Reviewed With  patient  -KADE     Progress  improving  -KADE     Outcome Summary  patient is able to ambulate 220 ft patient had  increased coughing with activity  -KADE     Row Name 06/21/20 0915          Physical Therapy Clinical Impression    Patient/Family Goals Statement (PT Clinical Impression)  return to PLOF  -KADE     Criteria for Skilled Interventions Met (PT Clinical Impression)  yes;treatment indicated  -KADE     Rehab Potential (PT Clinical Summary)  good, to achieve stated therapy goals  -KADE     Row Name 06/21/20 0915          Vital Signs    Pre Systolic BP Rehab  110  -KADE     Pre Treatment Diastolic BP  70  -KADE     Post Systolic BP Rehab  124  -KADE     Post Treatment Diastolic BP  80  -KADE     Pretreatment Heart Rate (beats/min)  80  -KADE     Posttreatment Heart Rate (beats/min)  88  -KADE     Pre SpO2 (%)  98  -KADE     O2 Delivery Pre Treatment  nasal cannula  -KADE     Post SpO2 (%)  99  -KADE     O2 Delivery Post Treatment  nasal cannula  -KADE     Pre Patient Position  Sitting  -KADE     Intra Patient Position  Standing  -KADE     Post Patient Position  Supine  -KADE     Row Name 06/21/20 0915          Positioning and Restraints    Pre-Treatment Position  sitting in chair/recliner  -KADE     Post Treatment Position  bed  -KADE     In Bed  notified nsg;supine;call light within reach;encouraged to call for assist  -KADE       User Key  (r) = Recorded By, (t) = Taken By, (c) = Cosigned By    Initials Name Provider Type    Yissel Goss, PT Physical Therapist        Outcome Measures     Row Name 06/21/20 0917          How much help from another person do you currently need...    Turning from your back to your side while in flat bed without using bedrails?  4  -KADE     Moving from lying on back to sitting on the side of a flat bed without bedrails?  3  -KADE     Moving to and from a bed to a chair (including a wheelchair)?  3  -KADE     Standing up from a chair using your arms (e.g., wheelchair, bedside chair)?  3  -KADE     Climbing 3-5 steps with a railing?  3  -KADE     To walk in hospital room?  3  -KADE     AM-PAC 6 Clicks Score (PT)  19  -KADE       User Key   (r) = Recorded By, (t) = Taken By, (c) = Cosigned By    Initials Name Provider Type    Yissel Goss PT Physical Therapist        Physical Therapy Education                 Title: PT OT SLP Therapies (In Progress)     Topic: Physical Therapy (In Progress)     Point: Mobility training (In Progress)     Description:   Instruct learner(s) on safety and technique for assisting patient out of bed, chair or wheelchair.  Instruct in the proper use of assistive devices, such as walker, crutches, cane or brace.              Patient Friendly Description:   It's important to get you on your feet again, but we need to do so in a way that is safe for you. Falling has serious consequences, and your personal safety is the most important thing of all.        When it's time to get out of bed, one of us or a family member will sit next to you on the bed to give you support.     If your doctor or nurse tells you to use a walker, crutches, a cane, or a brace, be sure you use it every time you get out of bed, even if you think you don't need it.    Learning Progress Summary           Patient Acceptance, E, NR by KADE at 6/21/2020 0830    Acceptance, E, NR by KADE at 6/21/2020 0830    Acceptance, E, NR by KADE at 6/20/2020 0950    Acceptance, E,D, NR by  at 6/19/2020 0829                   Point: Home exercise program (In Progress)     Description:   Instruct learner(s) on appropriate technique for monitoring, assisting and/or progressing patient with therapeutic exercises and activities.              Learning Progress Summary           Patient Acceptance, E, NR by KADE at 6/21/2020 0830    Acceptance, E, NR by KADE at 6/21/2020 0830    Acceptance, E, NR by KADE at 6/20/2020 0950                   Point: Body mechanics (In Progress)     Description:   Instruct learner(s) on proper positioning and spine alignment for patient and/or caregiver during mobility tasks and/or exercises.              Learning Progress Summary           Patient  Acceptance, E, NR by  at 6/21/2020 0830    Acceptance, E, NR by  at 6/21/2020 0830    Acceptance, E, NR by KADE at 6/20/2020 0950    Acceptance, E,D, NR by  at 6/19/2020 0829                   Point: Precautions (In Progress)     Description:   Instruct learner(s) on prescribed precautions during mobility and gait tasks              Learning Progress Summary           Patient Acceptance, E, NR by  at 6/21/2020 0830    Acceptance, E, NR by KADE at 6/21/2020 0830    Acceptance, E, NR by KADE at 6/20/2020 0950    Acceptance, E,D, NR by  at 6/19/2020 0829                               User Key     Initials Effective Dates Name Provider Type Discipline     06/19/15 -  Yissel Suazo, PT Physical Therapist PT     03/19/20 -  Sabrina Ortiz PT Student PT Student PT              PT Recommendation and Plan  Planned Therapy Interventions (PT Eval): balance training, bed mobility training, gait training, home exercise program, transfer training, strengthening  Outcome Summary/Treatment Plan (PT)  Anticipated Discharge Disposition (PT): home with assist  Plan of Care Reviewed With: patient  Progress: improving  Outcome Summary: patient is able to ambulate 220 ft patient had increased coughing with activity     Time Calculation:   PT Charges     Row Name 06/21/20 0830             Time Calculation    Start Time  0830  -      PT Received On  06/21/20  -      PT Goal Re-Cert Due Date  06/29/20  -         Time Calculation- PT    Total Timed Code Minutes- PT  23 minute(s)  -KADE         Timed Charges    73017 - PT Therapeutic Activity Minutes  23  -KADE        User Key  (r) = Recorded By, (t) = Taken By, (c) = Cosigned By    Initials Name Provider Type    Yissel Goss, PT Physical Therapist        Therapy Charges for Today     Code Description Service Date Service Provider Modifiers Qty    80077610263 HC PT THERAPEUTIC ACT EA 15 MIN 6/20/2020 Yissel Suazo, PT GP 2    09163105238 HC PT THERAPEUTIC ACT  EA 15 MIN 6/21/2020 Yissel Suazo, PT GP 2          PT G-Codes  Outcome Measure Options: AM-PAC 6 Clicks Basic Mobility (PT)  AM-PAC 6 Clicks Score (PT): 19    Yissel Suazo, PT  6/21/2020

## 2020-06-21 NOTE — PROGRESS NOTES
[facility] at [facility]  IP Progress Note      Chief Complaint/Reason for service: Postop management valvular heart disease    Subjective   Subjective: Patient still complains of pain with a deep breath and with movement.  He was told by the surgeon his chest tubes are coming out today.  He denies dyspnea.    Past medical, surgical, social and family history reviewed in the patient's electronic medical record.    Objective     Vital Sign Min/Max for last 24 hours  Temp  Min: 98 °F (36.7 °C)  Max: 99 °F (37.2 °C)   BP  Min: 89/58  Max: 174/48   Pulse  Min: 74  Max: 90   Resp  Min: 16  Max: 22   SpO2  Min: 95 %  Max: 100 %   Flow (L/min)  Min: 2  Max: 2      Intake/Output Summary (Last 24 hours) at 6/21/2020 0724  Last data filed at 6/21/2020 0600  Gross per 24 hour   Intake 839 ml   Output 3805 ml   Net -2966 ml             Current Facility-Administered Medications:   •  acetaminophen (TYLENOL) tablet 650 mg, 650 mg, Oral, Q4H PRN, Adriana Delacruz PA-C  •  ALPRAZolam (XANAX) tablet 0.25 mg, 0.25 mg, Oral, TID PRN, Adriana Delacruz PA-C, 0.25 mg at 06/19/20 0029  •  aspirin EC tablet 325 mg, 325 mg, Oral, Daily, Adriana Delacruz PA-C, 325 mg at 06/20/20 0807  •  atorvastatin (LIPITOR) tablet 40 mg, 40 mg, Oral, Nightly, Cooper Burden MD, 40 mg at 06/20/20 2057  •  HYDROcodone-acetaminophen (NORCO) 7.5-325 MG per tablet 1 tablet, 1 tablet, Oral, Q4H PRN, Cooper Burden MD, 1 tablet at 06/19/20 0834  •  ipratropium-albuterol (DUO-NEB) nebulizer solution 3 mL, 3 mL, Nebulization, Q4H PRN, Adriana Delacruz PA-C  •  ketorolac (TORADOL) injection 30 mg, 30 mg, Intravenous, Q8H PRN, Josie Beltran MD, 30 mg at 06/21/20 0433  •  metoprolol tartrate (LOPRESSOR) half tablet 12.5 mg, 12.5 mg, Oral, Q12H, Cooper Burden MD, 12.5 mg at 06/20/20 2057  •  morphine injection 2 mg, 2 mg, Intravenous, Q2H PRN, Cooper Burden MD  •  niCARdipine (CARDENE) 40 mg in 200 mL NS (0.2  mg/mL) infusion, 5-15 mg/hr, Intravenous, Continuous PRN, [place], Adriana, PA-C  •  nitroglycerin (TRIDIL) 200 mcg/ml infusion, 5-200 mcg/min, Intravenous, Continuous PRN, Zuri Delacruzi, PA-C, Stopped at 06/18/20 1500  •  norepinephrine (LEVOPHED) 8 mg/250 mL (32 mcg/mL) in sodium chloride 0.9% infusion (premix), 0.02-0.3 mcg/kg/min, Intravenous, Continuous PRN, [place], Adriana, PA-C  •  ondansetron (ZOFRAN) injection 4 mg, 4 mg, Intravenous, Q6H PRN, Adriana Delacruz, PA-C, 4 mg at 06/19/20 0102  •  oxyCODONE (ROXICODONE) immediate release tablet 10 mg, 10 mg, Oral, Q4H PRN, Cooper Burden MD  •  phenylephrine (COCO-SYNEPHRINE) 50 mg/250 mL (0.2 mg/mL) in 0.9% NS  infusion, 0.5-3 mcg/kg/min, Intravenous, Continuous PRN, Jayme, Adriana, PA-C  •  potassium chloride (MICRO-K) CR capsule 40 mEq, 40 mEq, Oral, PRN **OR** potassium chloride (KLOR-CON) packet 40 mEq, 40 mEq, Oral, PRN, Zuri Delacruzi, PA-C  •  sennosides-docusate (PERICOLACE) 8.6-50 MG per tablet 2 tablet, 2 tablet, Oral, BID, Cooper Burden MD, 2 tablet at 06/20/20 2057  •  sertraline (ZOLOFT) tablet 100 mg, 100 mg, Oral, Daily, Jayme, Adriana, PA-C, 100 mg at 06/20/20 0807  •  sodium chloride 0.9 % flush 10 mL, 10 mL, Intravenous, Q12H, [place], Adriana, PA-C, 10 mL at 06/20/20 2057  •  sodium chloride 0.9 % flush 10 mL, 10 mL, Intravenous, PRN, Jayme, Adriana, PA-C, 10 mL at 06/18/20 0638  •  tamsulosin (FLOMAX) 24 hr capsule 0.4 mg, 0.4 mg, Oral, Daily, Adriana Delacruz PA-C, 0.4 mg at 06/20/20 0807    Physical Exam: General overweight male sleeping awakens easily looks uncomfortable with movement or deep breathing not dyspneic not tachypneic        HEENT: No JVP, central line present right neck no icterus       Respiratory: Equal bilateral symmetrical expansion overall clear to auscultation bilaterally       Cardiovascular: Regular rate and rhythm with ectopics and a rub and no edema       GI: Obese with positive bowel sounds       Lower  Extremities: No lesions       Neuro: Show expressions are symmetrical he is able to move all 4 extremities       Skin: Warm and dry no edema to palpation       Psych: Pleasant affect    Results Review: Output exceeds intake by 2.9 L.  Heart rate 77-90.  Blood pressure is good.  T-max 99 7    Radiology Results:  Imaging Results (Last 72 Hours)     Procedure Component Value Units Date/Time    XR Chest 1 View [[id number]] Collected:  06/20/20 0812     Updated:  06/20/20 1417    Narrative:          EXAMINATION: XR CHEST 1 VW - 06/20/2020     INDICATION: Post-op heart surgery. I34.0-Nonrheumatic mitral (valve)  insufficiency; R94.39-Abnormal result of other cardiovascular function  study      COMPARISON: Chest x-ray 06/19/2020     FINDINGS: Support hardware unchanged and in satisfactory positioning.  Cardiac silhouette enlarged with increased central pulmonary  vascularity. No pneumothorax or large effusion with slight decrease in  opacifications at the left lung base may represent improving effusion or  atelectasis.           Impression:       Decreased opacifications at the left lung base are present  with improving effusion or atelectasis.     DICTATED:   06/20/2020  EDITED/ls :   06/20/2020      This report was finalized on 6/20/2020 2:14 PM by Dr. Nash Tellez.       XR Chest 1 View [[id number]] Collected:  06/19/20 0827     Updated:  06/19/20 2119    Narrative:       EXAMINATION: XR CHEST 1 VW-06/19/2020:     INDICATION: Post-Op Heart Surgery; I34.0-Nonrheumatic mitral (valve)  insufficiency; R94.39-Abnormal result of other cardiovascular function  study.     COMPARISON: 06/18/2020.     FINDINGS: ET tube and NG tube have been removed. PA catheter has been  pulled back to the SVC. The heart is enlarged. The vasculature appears  upper limits of normal. There is mild, improving left basilar  atelectasis and no evidence of pneumothorax.       Impression:       Mild remaining left basilar atelectasis. No pneumothorax  or  other new chest disease is seen.     D:  06/19/2020  E:  06/19/2020            This report was finalized on 6/19/2020 9:16 PM by Dr. Mariano Jj MD.       XR Chest 1 View [[id number]] Collected:  06/18/20 1135     Updated:  06/18/20 1651    Narrative:          EXAMINATION: XR CHEST 1 VW-06/18/2020:      INDICATION: Tube repositioning; I34.0-Nonrheumatic mitral (valve)  insufficiency; R94.39-Abnormal result of other cardiovascular function  study.      COMPARISON: Chest x-ray dated 06/18/2020.     FINDINGS: Interval repositioning of the endotracheal tube now  approximately 2.5 cm above the level of the rodney. Support hardware  otherwise unchanged. No pneumothorax or pleural effusion.       Impression:       Interval repositioning of the endotracheal tube now  approximately 2.5 cm above the level the rodney. Support hardware  otherwise unchanged. No pneumothorax or pleural effusion.     D:  06/18/2020  E:  06/18/2020     This report was finalized on 6/18/2020 4:47 PM by Dr. Nash Tellez.       XR Chest 1 View [[id number]] Collected:  06/18/20 1044     Updated:  06/18/20 1650    Narrative:          EXAMINATION: XR CHEST 1 VW-06/18/2020:      INDICATION: Post-Op Check Line & Tube Placement; I34.0-Nonrheumatic  mitral (valve) insufficiency; R94.39-Abnormal result of other  cardiovascular function study.      COMPARISON: Chest x-ray dated 06/16/2020.     FINDINGS: Postoperative appearance of the chest status post median  sternotomy with endotracheal tube below the level of the rodney in the  right mainstem bronchus of a right mainstem intubation. Esophagogastric  tube courses below the diaphragm and out of the field-of-view.  Mediastinal and pleural drains in place. Right internal jugular  pulmonary arterial catheter is likely coiled within the left main  pulmonary artery. No pneumothorax or large effusion.       Impression:       Postoperative appearance of the chest status post median  sternotomy with endotracheal  tube below the level of the rodney in the  right mainstem bronchus of a right mainstem intubation with  repositioning recommended. Esophagogastric tube courses below the  diaphragm and out of the field-of-view. Mediastinal and pleural drains  in place. Right internal jugular pulmonary arterial catheter is likely  coiled within the left main pulmonary artery.     D:  06/18/2020  E:  06/18/2020     This report was finalized on 6/18/2020 4:47 PM by Dr. Nash Tellez.             EKG: Sinus rhythm nonspecific ST abnormalities    ECHO: Normal EF    Tele: Sinus rhythm with frequent PACs    Assessment   Assessment/Plan: #1 valvular heart disease-patient presented with significant dyspnea status post mitral valve replacement.  He is doing well other than pain and has no evidence of CHF  2 stable postop anemia  3 hyponatremia improved  4 frequent PACs-I will increase metoprolol to 25 mg twice daily    Cooper Puente MD  06/21/20  07:24

## 2020-06-21 NOTE — PLAN OF CARE
Problem: Patient Care Overview  Goal: Plan of Care Review  Outcome: Ongoing (interventions implemented as appropriate)  Flowsheets (Taken 6/21/2020 0915)  Progress: improving  Plan of Care Reviewed With: patient  Outcome Summary: patient is able to ambulate 220 ft patient had increased coughing with activity

## 2020-06-21 NOTE — NURSING NOTE
Prior to central line removal, order for the removal of catheter was verified, patient was assessed, necessary materials were gathered and patient was educated regarding procedure .    Patient was positioned supine to ensure that the insertion site was at or below the level of the heart.    Hands were washed, clean gloves were applied and central line dressing was gently removed. Catheter exit site was not cultured.     A new pair of clean gloves were then applied. Insertion site was cleansed with 2% Chlorhexidine swab using a circular motion beginning at the insertion site and moving outward for 30 seconds and allowed to dry.     Clamp on line was present and clamped.     Patient was instructed to hold breath as catheter was withdrawn.     The central line was grasped at the insertion site and slowly pulled outward parallel to the skin. Resistance was not met.    After central line was completely removed, a sterile 4x4 gauze pad was used to apply light pressure until bleeding stopped. At that time, petroleum-based gauze and a sterile occlusive dressing was applied to exit site.     Patient was instructed to keep dressing in place for at least 24 hours and to remain in a flat or reclining position for 30 minutes post-removal.     Catheter was inspected after removal and Intact. Tip of central line was not sent for culture. Patient tolerated procedure.

## 2020-06-22 LAB
ANION GAP SERPL CALCULATED.3IONS-SCNC: 14 MMOL/L (ref 5–15)
BUN BLD-MCNC: 20 MG/DL (ref 8–23)
BUN/CREAT SERPL: 29.4 (ref 7–25)
CALCIUM SPEC-SCNC: 8.8 MG/DL (ref 8.6–10.5)
CHLORIDE SERPL-SCNC: 93 MMOL/L (ref 98–107)
CO2 SERPL-SCNC: 27 MMOL/L (ref 22–29)
CREAT BLD-MCNC: 0.68 MG/DL (ref 0.76–1.27)
GFR SERPL CREATININE-BSD FRML MDRD: 119 ML/MIN/1.73
GLUCOSE BLD-MCNC: 98 MG/DL (ref 65–99)
POTASSIUM BLD-SCNC: 3.8 MMOL/L (ref 3.5–5.2)
SODIUM BLD-SCNC: 134 MMOL/L (ref 136–145)

## 2020-06-22 PROCEDURE — 94799 UNLISTED PULMONARY SVC/PX: CPT

## 2020-06-22 PROCEDURE — 99024 POSTOP FOLLOW-UP VISIT: CPT | Performed by: THORACIC SURGERY (CARDIOTHORACIC VASCULAR SURGERY)

## 2020-06-22 PROCEDURE — 80048 BASIC METABOLIC PNL TOTAL CA: CPT | Performed by: PHYSICIAN ASSISTANT

## 2020-06-22 PROCEDURE — 99024 POSTOP FOLLOW-UP VISIT: CPT | Performed by: PHYSICIAN ASSISTANT

## 2020-06-22 RX ADMIN — HYDROCODONE BITARTRATE AND ACETAMINOPHEN 1 TABLET: 7.5; 325 TABLET ORAL at 16:02

## 2020-06-22 RX ADMIN — HYDROCODONE BITARTRATE AND ACETAMINOPHEN 1 TABLET: 7.5; 325 TABLET ORAL at 20:59

## 2020-06-22 RX ADMIN — SENNOSIDES AND DOCUSATE SODIUM 2 TABLET: 8.6; 5 TABLET ORAL at 08:14

## 2020-06-22 RX ADMIN — METOPROLOL TARTRATE 25 MG: 25 TABLET, FILM COATED ORAL at 20:59

## 2020-06-22 RX ADMIN — HYDROCODONE BITARTRATE AND ACETAMINOPHEN 1 TABLET: 7.5; 325 TABLET ORAL at 05:13

## 2020-06-22 RX ADMIN — SODIUM CHLORIDE, PRESERVATIVE FREE 10 ML: 5 INJECTION INTRAVENOUS at 20:59

## 2020-06-22 RX ADMIN — SERTRALINE HYDROCHLORIDE 100 MG: 100 TABLET ORAL at 08:13

## 2020-06-22 RX ADMIN — ASPIRIN 325 MG: 325 TABLET, COATED ORAL at 08:14

## 2020-06-22 RX ADMIN — METOPROLOL TARTRATE 25 MG: 25 TABLET, FILM COATED ORAL at 08:13

## 2020-06-22 RX ADMIN — TAMSULOSIN HYDROCHLORIDE 0.4 MG: 0.4 CAPSULE ORAL at 08:14

## 2020-06-22 RX ADMIN — HYDROCODONE BITARTRATE AND ACETAMINOPHEN 1 TABLET: 7.5; 325 TABLET ORAL at 10:16

## 2020-06-22 RX ADMIN — SODIUM CHLORIDE, PRESERVATIVE FREE 10 ML: 5 INJECTION INTRAVENOUS at 08:15

## 2020-06-22 RX ADMIN — OXYCODONE 10 MG: 5 TABLET ORAL at 03:28

## 2020-06-22 RX ADMIN — ATORVASTATIN CALCIUM 40 MG: 40 TABLET, FILM COATED ORAL at 21:00

## 2020-06-22 NOTE — PROGRESS NOTES
CTS Progress Note      POD 4 s/p MVR, Left atrial appendage ligation     LOS: 5 days     Subjective  Transferred to telemetry this morning.  VSS. On 2L NC. Reports incisional pain.  Denies shortness of breath.    Objective    Vital Signs  Temp:  [98 °F (36.7 °C)-99 °F (37.2 °C)] 98 °F (36.7 °C)  Heart Rate:  [73-85] 82  Resp:  [16-20] 18  BP: (100-126)/(64-97) 119/71    Physical Exam:   General Appearance: alert, appears stated age and cooperative   Lungs: clear to auscultation   Heart: regular rhythm & normal rate, normal S1, S2 and no murmur, no gallop, no rub   Chest: sternum stable   Skin: Incision c/d/i     Results   Results from last 7 days   Lab Units 06/21/20  0334   WBC 10*3/mm3 5.37   HEMOGLOBIN g/dL 8.5*   HEMATOCRIT % 27.5*   PLATELETS 10*3/mm3 125*     Results from last 7 days   Lab Units 06/22/20  0414   SODIUM mmol/L 134*   POTASSIUM mmol/L 3.8   CHLORIDE mmol/L 93*   CO2 mmol/L 27.0   BUN mg/dL 20   CREATININE mg/dL 0.68*   GLUCOSE mg/dL 98   CALCIUM mg/dL 8.8       Imaging Results (Last 24 Hours)     ** No results found for the last 24 hours. **          Assessment    Severe mitral valve regurgitation (S/P MVR)    Hypertension    Dyslipidemia    BPH     Anxiety and depression    Chewing tobacco use      Plan   Wean oxygen as tolerated  Ambulate  Pulm toilet  Home in 24-48 hours    Adriana Delarcuz PA-C  06/22/20  10:20

## 2020-06-22 NOTE — PROGRESS NOTES
Continued Stay Note  [facility]     Patient Name: Rakan oBone  MRN: [id number]  Today's Date: 6/22/2020    Admit Date: 6/16/2020    Discharge Plan     Row Name 06/22/20 1149       Plan    Plan  HOME    Patient/Family in Agreement with Plan  yes    Plan Comments  Met with pt at bedside to f/u DCP.  Goal remians to return home upon DC.  RN to assess RA sat today (currently on 2lpm NC).  No immediate CM needs identified/voiced.  CM will cont to follow.     Final Discharge Disposition Code  01 - home or self-care        Discharge Codes    No documentation.       Expected Discharge Date and Time     Expected Discharge Date Expected Discharge Time    Jun 16, 2020             Terri Whatley RN

## 2020-06-22 NOTE — PROGRESS NOTES
[facility]       LOS: 5 days   Patient Care Team:  Provider, No Known as PCP - General        Subjective       Patient Denies: Sore but no other complaints      Vital Signs  Temp:  [98 °F (36.7 °C)-99 °F (37.2 °C)] 98 °F (36.7 °C)  Heart Rate:  [] 81  Resp:  [16-20] 18  BP: (100-126)/() 113/76    Intake/Output Summary (Last 24 hours) at 6/22/2020 0806  Last data filed at 6/22/2020 0503  Gross per 24 hour   Intake 360 ml   Output 700 ml   Net -340 ml     No intake/output data recorded.    Physical Exam:     General Appearance:    Alert and oriented       Neck:   No adenopathy, supple, trachea midline, no thyromegaly, no JVD   Lungs:     Clear to auscultation anteriorly,respirations regular, even and                unlabored    Heart:    Regular rhythm and normal rate, normal S1 and S2, no         murmur, no gallop, no rub, no click   Chest Wall:    No abnormalities observed   Abdomen:     Normal bowel sounds, no masses, no organomegaly, soft        Extremities:  Trace edema   Pulses:   Pulses palpable and equal bilaterally     Results Review:     I reviewed the patient's new clinical results.      WBC WBC   Date/Time Value Ref Range Status   06/21/2020 0334 5.37 3.40 - 10.80 10*3/mm3 Final   06/20/2020 0322 6.00 3.40 - 10.80 10*3/mm3 Final            HGB Hemoglobin   Date/Time Value Ref Range Status   06/21/2020 0334 8.5 (L) 13.0 - 17.7 g/dL Final   06/20/2020 0322 8.5 (L) 13.0 - 17.7 g/dL Final           HCT Hematocrit   Date/Time Value Ref Range Status   06/21/2020 0334 27.5 (L) 37.5 - 51.0 % Final   06/20/2020 0322 27.8 (L) 37.5 - 51.0 % Final            Platlets Platelets   Date/Time Value Ref Range Status   06/21/2020 0334 125 (L) 140 - 450 10*3/mm3 Final   06/20/2020 0322 109 (L) 140 - 450 10*3/mm3 Final     Sodium  Sodium   Date/Time Value Ref Range Status   06/22/2020 0414 134 (L) 136 - 145 mmol/L Final   06/21/2020 0334 134 (L) 136 -  145 mmol/L Final   06/20/2020 0322 129 (L) 136 - 145 mmol/L Final     Potassium  Potassium   Date/Time Value Ref Range Status   06/22/2020 0414 3.8 3.5 - 5.2 mmol/L Final   06/21/2020 0334 4.0 3.5 - 5.2 mmol/L Final   06/20/2020 0322 4.5 3.5 - 5.2 mmol/L Final     Chloride  Chloride   Date/Time Value Ref Range Status   06/22/2020 0414 93 (L) 98 - 107 mmol/L Final   06/21/2020 0334 93 (L) 98 - 107 mmol/L Final   06/20/2020 0322 94 (L) 98 - 107 mmol/L Final     BicarbonateNo results found for: PLASMABICARB    BUN BUN   Date/Time Value Ref Range Status   06/22/2020 0414 20 8 - 23 mg/dL Final   06/21/2020 0334 21 8 - 23 mg/dL Final   06/20/2020 0322 21 8 - 23 mg/dL Final      Creatinine Creatinine   Date/Time Value Ref Range Status   06/22/2020 0414 0.68 (L) 0.76 - 1.27 mg/dL Final   06/21/2020 0334 0.93 0.76 - 1.27 mg/dL Final   06/20/2020 0322 1.03 0.76 - 1.27 mg/dL Final      Calcium Calcium   Date/Time Value Ref Range Status   06/22/2020 0414 8.8 8.6 - 10.5 mg/dL Final   06/21/2020 0334 8.7 8.6 - 10.5 mg/dL Final   06/20/2020 0322 8.6 8.6 - 10.5 mg/dL Final      Mag @RESULFAST(MG:3)@        PT/INR:       Lab Results   Component Value Date    PROTIME 15.9 (H) 06/19/2020    PROTIME 16.9 (H) 06/18/2020    PROTIME 14.2 (H) 06/16/2020    INR 1.30 (H) 06/19/2020    INR 1.41 (H) 06/18/2020    INR 1.13 06/16/2020      Troponin I:  No results found for: TROPONINI No results found for: CKTOTAL, CKMB, CKMBINDEX, POCRTROPI, TROPONINT      aspirin 325 mg Oral Daily   atorvastatin 40 mg Oral Nightly   metoprolol tartrate 25 mg Oral Q12H   sennosides-docusate 2 tablet Oral BID   sertraline 100 mg Oral Daily   sodium chloride 10 mL Intravenous Q12H   tamsulosin 0.4 mg Oral Daily          Assessment/Plan     Patient Active Problem List   Diagnosis Code   • Severe mitral valve regurgitation (S/P MVR) I34.0   • Hypertension I10   • Dyslipidemia E78.5   • BPH  N40.0   • Anxiety and depression F41.9, F32.9   • Chewing tobacco use Z72.0      CV stable post tissue MVR and left atrial appendage ligation  Stable coronary artery disease  Normal preop LV function  Ambulate  Home soon    MD Miquel Cadet PA  06/22/20  08:06

## 2020-06-22 NOTE — PLAN OF CARE
Problem: Patient Care Overview  Goal: Plan of Care Review  Flowsheets (Taken 6/22/2020 0504)  Progress: improving  Plan of Care Reviewed With: patient  Note:   VSS, 2L NC qHS. NSR overnight. Adequate UOP. Pain managed c PRN meds. Ambulated 220 ft x1 assist. Transferred to  445 @ 0500 this am. Report given to JAIME Rivas.

## 2020-06-23 VITALS
WEIGHT: 179.6 LBS | SYSTOLIC BLOOD PRESSURE: 118 MMHG | OXYGEN SATURATION: 93 % | TEMPERATURE: 99 F | BODY MASS INDEX: 28.19 KG/M2 | HEIGHT: 67 IN | HEART RATE: 82 BPM | DIASTOLIC BLOOD PRESSURE: 75 MMHG | RESPIRATION RATE: 18 BRPM

## 2020-06-23 PROCEDURE — 99024 POSTOP FOLLOW-UP VISIT: CPT | Performed by: PHYSICIAN ASSISTANT

## 2020-06-23 PROCEDURE — 99024 POSTOP FOLLOW-UP VISIT: CPT | Performed by: THORACIC SURGERY (CARDIOTHORACIC VASCULAR SURGERY)

## 2020-06-23 RX ORDER — HYDROCODONE BITARTRATE AND ACETAMINOPHEN 7.5; 325 MG/1; MG/1
1 TABLET ORAL EVERY 6 HOURS PRN
Qty: 24 TABLET | Refills: 0
Start: 2020-06-23

## 2020-06-23 RX ADMIN — SODIUM CHLORIDE, PRESERVATIVE FREE 10 ML: 5 INJECTION INTRAVENOUS at 09:24

## 2020-06-23 RX ADMIN — HYDROCODONE BITARTRATE AND ACETAMINOPHEN 1 TABLET: 7.5; 325 TABLET ORAL at 11:35

## 2020-06-23 RX ADMIN — HYDROCODONE BITARTRATE AND ACETAMINOPHEN 1 TABLET: 7.5; 325 TABLET ORAL at 06:06

## 2020-06-23 RX ADMIN — SERTRALINE HYDROCHLORIDE 100 MG: 100 TABLET ORAL at 09:23

## 2020-06-23 RX ADMIN — SENNOSIDES AND DOCUSATE SODIUM 2 TABLET: 8.6; 5 TABLET ORAL at 09:23

## 2020-06-23 RX ADMIN — METOPROLOL TARTRATE 25 MG: 25 TABLET, FILM COATED ORAL at 09:23

## 2020-06-23 RX ADMIN — ASPIRIN 325 MG: 325 TABLET, COATED ORAL at 09:23

## 2020-06-23 RX ADMIN — TAMSULOSIN HYDROCHLORIDE 0.4 MG: 0.4 CAPSULE ORAL at 09:23

## 2020-06-23 NOTE — DISCHARGE SUMMARY
Date of Discharge:  6/23/2020              [facility]  Date of Admit: 6/16/2020    Kenneth Britton MD      Discharge Diagnosis:  Severe mitral valve regurgitation (S/P MVR)    Hypertension    Dyslipidemia    BPH     Anxiety and depression    Chewing tobacco use      Hospital Course: Mr. Boone is a pleasant 59-year-old male with a history of hypertension, hypercholesterolemia, and coronary artery disease.  He is post previous LAD stent.  He was admitted to [facility] on 6/16/2020.  He underwent a heart catheterization which revealed a patent LAD stent with 4+ MR.  He subsequently underwent an MVR and left atrial appendage ligation by Dr. Burden.  He tolerated the procedure well, there were no significant complications.  Over the next several days he continued to progress.  Today he is denying any complaints and is therefore felt ready for discharge.    Procedures Performed  Procedure(s):  MITRAL VALVEREPLACEMENT, MITRAL CLIP, TRANSESOPHAGEAL ECHOCARDIOGRAM       Consults     Date and Time Order Name Status Description    6/18/2020 1006 Inpatient Consult to Cardiology            Pertinent Test Results: angiography: Patent LAD stent, 4+ MR, EF~50%  .      Discharge Physical Exam:    General Appearance No acute distress   Neck No adenopathy, supple, trachea midline, no JVD   Lungs Clear to auscultation,respirations regular, even and unlabored   Heart Regular rhythm and normal rate, normal S1 and S2, no murmur, no gallop, no rub, no click   Chest wall No abnormalities observed   Abdomen Normal bowel sounds, no masses, no hepatomegaly, soft   Extremities Moves all extremities well, no edema, no cyanosis, no redness   Neurological Alert and oriented x 3     Discharge Medications     Discharge Medications      New Medications      Instructions Start Date   aspirin 325 MG EC tablet   325 mg, Oral, Daily      HYDROcodone-acetaminophen 7.5-325 MG per tablet  Commonly known as:  NORCO   1  tablet, Oral, Every 6 Hours PRN         Continue These Medications      Instructions Start Date   amLODIPine 5 MG tablet  Commonly known as:  NORVASC   5 mg, Oral, Daily      atorvastatin 40 MG tablet  Commonly known as:  LIPITOR   40 mg, Oral, Daily      furosemide 20 MG tablet  Commonly known as:  LASIX   20 mg, Oral, Daily      isosorbide mononitrate 30 MG 24 hr tablet  Commonly known as:  IMDUR   30 mg, Oral, Daily      metoprolol succinate  MG 24 hr tablet  Commonly known as:  TOPROL-XL   100 mg, Oral, Daily      potassium chloride 20 MEQ CR tablet  Commonly known as:  K-DUR,KLOR-CON   20 mEq, Oral, 2 Times Daily      sertraline 100 MG tablet  Commonly known as:  ZOLOFT   100 mg, Oral, Daily      tamsulosin 0.4 MG capsule 24 hr capsule  Commonly known as:  FLOMAX   1 capsule, Oral, Daily             Discharge Diet:   Diet Instructions     Diet: Consistent Carbohydrate, Cardiac      Discharge Diet:   Consistent Carbohydrate  Cardiac             Activity at Discharge:   Activity Instructions     Bathing Restrictions      Type of Restriction:  Bathing    Bathing Restrictions:  No Tub Bath    Driving Restrictions      Type of Restriction:  Driving    Driving Restrictions:  No Driving Until Next Appointment    Lifting Restrictions      Type of Restriction:  Lifting    Lifting Restrictions:  Lifting Restriction (Indicate Limit)    Weight Limit (Pounds):  10    Length of Lifting Restriction:  until next appointment          Discharge disposition: home    Condition on Discharge: stable    Follow-up Appointments  No future appointments.  Additional Instructions for the Follow-ups that You Need to Schedule     Discharge Follow-up with Specialty: Follow up with Dr. Burden in clinic in 3-4 weeks   As directed      Specialty:  Follow up with Dr. Burden in clinic in 3-4 weeks         Discharge Follow-up with Specified Provider: Dr. Goldstein; 1 Month   As directed      To:  Dr. Goldstein    Follow Up:  1 Month                     NIKOLAY Gilmore  06/23/20  09:08

## 2020-06-23 NOTE — PLAN OF CARE
VSS on room air. Pt complained of pain once and PRN medication was given. Has been able to sleep between care. Possible discharge today. No signs of acute distress.

## 2020-06-23 NOTE — PROGRESS NOTES
CTS Progress Note      POD 5 s/p MVR, Left atrial appendage ligation     LOS: 6 days     Subjective  No acute events overnight. On room air. VSS. Mild incisional pain.  Denies shortness of breath.    Objective    Vital Signs  Temp:  [98.2 °F (36.8 °C)-99 °F (37.2 °C)] 99 °F (37.2 °C)  Heart Rate:  [76-91] 84  Resp:  [16-20] 18  BP: (110-138)/(68-88) 130/88    Physical Exam:   General Appearance: alert, appears stated age and cooperative   Lungs: clear to auscultation   Heart: regular rhythm & normal rate, normal S1, S2 and no murmur, no gallop, no rub   Chest: sternum stable   Skin: Incision c/d/i     Results     Results from last 7 days   Lab Units 06/21/20  0334   WBC 10*3/mm3 5.37   HEMOGLOBIN g/dL 8.5*   HEMATOCRIT % 27.5*   PLATELETS 10*3/mm3 125*     Results from last 7 days   Lab Units 06/22/20  0414   SODIUM mmol/L 134*   POTASSIUM mmol/L 3.8   CHLORIDE mmol/L 93*   CO2 mmol/L 27.0   BUN mg/dL 20   CREATININE mg/dL 0.68*   GLUCOSE mg/dL 98   CALCIUM mg/dL 8.8       Imaging Results (Last 24 Hours)     ** No results found for the last 24 hours. **          Assessment    Severe mitral valve regurgitation (S/P MVR)    Hypertension    Dyslipidemia    BPH     Anxiety and depression    Chewing tobacco use      Plan   Ambulate  Pulm toilet  Discharge home today if all agree    Adriana Delacruz PA-C  06/23/20  08:32

## 2020-06-24 ENCOUNTER — READMISSION MANAGEMENT (OUTPATIENT)
Dept: CALL CENTER | Facility: HOSPITAL | Age: 60
End: 2020-06-24

## 2020-06-24 ENCOUNTER — DOCUMENTATION (OUTPATIENT)
Dept: CARDIAC REHAB | Facility: HOSPITAL | Age: 60
End: 2020-06-24

## 2020-06-24 NOTE — OUTREACH NOTE
Prep Survey      Responses   [facility] patient discharged from?  [place]   Is LACE score < 7 ?  No   Eligibility  Readm Mgmt   Discharge diagnosis  Severe mitral valve regurgitation, s//p MVR, mitral clip, REMBERTO   COVID-19 Test Status  Negative   Does the patient have one of the following disease processes/diagnoses(primary or secondary)?  Cardiothoracic surgery   Does the patient have Home health ordered?  No   Is there a DME ordered?  No   Comments regarding appointments  see AVS   Medication alerts for this patient  see AVS   Prep survey completed?  Yes          Jaci Carreon RN

## 2020-06-26 LAB — CREAT BLDA-MCNC: 0.9 MG/DL (ref 0.6–1.3)

## 2020-06-29 ENCOUNTER — READMISSION MANAGEMENT (OUTPATIENT)
Dept: CALL CENTER | Facility: HOSPITAL | Age: 60
End: 2020-06-29

## 2020-06-29 NOTE — OUTREACH NOTE
CT Surgery Week 1 Survey      Responses   [facility] patient discharged from?  [place]   Does the patient have one of the following disease processes/diagnoses(primary or secondary)?  Cardiothoracic surgery   Is there a successful TCM telephone encounter documented?  No   Week 1 attempt successful?  Yes   Call start time  1113   Call end time  1120   Discharge diagnosis  Severe mitral valve regurgitation, s//p MVR, mitral clip, REMBERTO   Meds reviewed with patient/caregiver?  Yes   Does the patient have all medications related to this admission filled (includes all antibiotics, pain medications, cardiac medications, etc.)  Yes   Is the patient taking all medications as directed (includes completed medication regime)?  Yes   Does the patient have a primary care provider?   Yes   Does the patient have an appointment scheduled with their C/T surgeon?  Yes   Has the patient kept scheduled appointments due by today?  N/A   Has home health visited the patient within 72 hours of discharge?  N/A   Psychosocial issues?  No   Did the patient receive a copy of their discharge instructions?  Yes   Nursing interventions  Reviewed instructions with patient   What is the patient's perception of their health status since discharge?  Improving   Nursing interventions  Nurse provided patient education   Is the patient/caregiver able to teach back normal signs of recovery?  Pain or discomfort at incisional site   Nursing interventions  Reassured on normal signs of recovery   Is the patient /caregiver able to teach back basic post-op care?  Continue use of incentive spirometry at least 1 week post discharge, Practice 'cough and deep breath', Lifting as instructed by MD in discharge instructions, Drive as instructed by MD in discharge instructions, Take showers only when approved by MD-sponge bathe until then, No tub bath, swimming, or hot tub until instructed by MD, Keep incision areas clean, dry and protected   Is the  patient/caregiver able to teach back signs and symptoms of incisional infection?  Increased redness, swelling or pain at the incisonal site, Increased drainage or bleeding, Incisional warmth, Pus or odor from incision, Fever   Is the patient/caregiver able to teach back steps to recovery at home?  Set small, achievable goals for return to baseline health, Rest and rebuild strength, gradually increase activity   Is the patient /caregiver able to teach back the importance of cardiac rehab?  Yes   Nursing interventions  Provided education on importance of cardiac rehab   Is the patient/caregiver able to teach back the hierarchy of who to call/visit for symptoms/problems? PCP, Specialist, Home health nurse, Urgent Care, ED, 911  Yes   Additional teach back comments  states not sure about card rehab due to living 2 hours from [place] and availability of facilities locally, advised to discuss with MD's at next appt   Week 1 call completed?  Yes            Justina Hogan RN

## 2020-07-07 ENCOUNTER — READMISSION MANAGEMENT (OUTPATIENT)
Dept: CALL CENTER | Facility: HOSPITAL | Age: 60
End: 2020-07-07

## 2020-07-07 ENCOUNTER — DOCUMENTATION (OUTPATIENT)
Dept: CARDIAC REHAB | Facility: HOSPITAL | Age: 60
End: 2020-07-07

## 2020-07-07 NOTE — PROGRESS NOTES
Cardiac Rehab staff mailed referral letter to patient regarding Phase II Cardiac Rehab program. Instruction for patient to contact [facility] for additional program information and to forward referral to closest facility.

## 2020-07-07 NOTE — OUTREACH NOTE
CT Surgery Week 2 Survey      Responses   [facility] patient discharged from?  [place]   Does the patient have one of the following disease processes/diagnoses(primary or secondary)?  Cardiothoracic surgery   Week 2 attempt successful?  Yes   Call start time  1620   Call end time  1631   Discharge diagnosis  Severe mitral valve regurgitation, s//p MVR, mitral clip, REMBERTO   Is patient permission given to speak with other caregiver?  No   Meds reviewed with patient/caregiver?  Yes   Is the patient having any side effects they believe may be caused by any medication additions or changes?  No   Does the patient have all medications related to this admission filled (includes all antibiotics, pain medications, cardiac medications, etc.)  Yes   Is the patient taking all medications as directed (includes completed medication regime)?  Yes   Does the patient have a primary care provider?   Yes   Does the patient have an appointment scheduled with their C/T surgeon?  Yes   Comments regarding PCP  PCP Dr Britton   Has the patient kept scheduled appointments due by today?  No   What is preventing the patient from keeping their appointments?  Doesn't understand importance [Patient thought that the appt with Laxmi Vivar was cardiac rehab. He will call back and reschedule appt. ]   Nursing Interventions  Advised to reschedule appointment   Has home health visited the patient within 72 hours of discharge?  N/A   Psychosocial issues?  No   Did the patient receive a copy of their discharge instructions?  Yes   Nursing interventions  Reviewed instructions with patient   What is the patient's perception of their health status since discharge?  Worsening [Patient reports that he has some dizziness, sees spots. ]   Nursing interventions  Nurse provided patient education, Advised patient to call provider   Nursing interventions  Advised to call cardiologist   Is the patient/caregiver able to teach back steps to recovery at home?   Set small, achievable goals for return to baseline health, Rest and rebuild strength, gradually increase activity   Is the patient /caregiver able to teach back the importance of cardiac rehab?  Yes   Nursing interventions  Provided education on importance of cardiac rehab   Is the patient/caregiver able to teach back the hierarchy of who to call/visit for symptoms/problems? PCP, Specialist, Home health nurse, Urgent Care, ED, 911  Yes   Additional teach back comments  Patient to monitor home blood pressure and HR and take record to appt.    Week 2 call completed?  Yes          Jaci Hart RN

## 2020-07-15 ENCOUNTER — READMISSION MANAGEMENT (OUTPATIENT)
Dept: CALL CENTER | Facility: HOSPITAL | Age: 60
End: 2020-07-15

## 2020-07-15 NOTE — OUTREACH NOTE
CT Surgery Week 3 Survey      Responses   [facility] patient discharged from?  [place]   Does the patient have one of the following disease processes/diagnoses(primary or secondary)?  Cardiothoracic surgery   Week 3 attempt successful?  Yes   Call start time  1106   Call end time  1108   Discharge diagnosis  Severe mitral valve regurgitation, s//p MVR, mitral clip, REMBERTO   Meds reviewed with patient/caregiver?  Yes   Is the patient taking all medications as directed (includes completed medication regime)?  Yes   Comments regarding appointments  Appt with Laxmi Vivar was been rescheduled for the first of Aug   Does the patient have an appointment scheduled with their C/T surgeon?  Yes [7/20/20]   Has the patient kept scheduled appointments due by today?  No   What is the patient's perception of their health status since discharge?  Improving   Is the patient/caregiver able to teach back signs and symptoms of incisional infection?  Fever   Is the patient/caregiver able to teach back steps to recovery at home?  Eat a well-balance diet   Is the patient /caregiver able to teach back the importance of cardiac rehab?  -- [Pt declined ]   If the patient is a current smoker, are they able to teach back resources for cessation?  -- [nonsmoker]   Week 3 call completed?  Yes   Revoked  No further contact(revokes)-requires comment   Graduated/Revoked comments  Pt is doing well          Nivia Palmer RN

## 2020-07-20 ENCOUNTER — OFFICE VISIT (OUTPATIENT)
Dept: CARDIAC SURGERY | Facility: CLINIC | Age: 60
End: 2020-07-20

## 2020-07-20 VITALS
SYSTOLIC BLOOD PRESSURE: 119 MMHG | TEMPERATURE: 98 F | HEART RATE: 74 BPM | HEIGHT: 67 IN | BODY MASS INDEX: 29.51 KG/M2 | OXYGEN SATURATION: 99 % | WEIGHT: 188 LBS | DIASTOLIC BLOOD PRESSURE: 65 MMHG

## 2020-07-20 DIAGNOSIS — Z95.2 S/P MVR (MITRAL VALVE REPLACEMENT): Primary | ICD-10-CM

## 2020-07-20 PROCEDURE — 71046 X-RAY EXAM CHEST 2 VIEWS: CPT | Performed by: NURSE PRACTITIONER

## 2020-07-20 PROCEDURE — 99024 POSTOP FOLLOW-UP VISIT: CPT | Performed by: NURSE PRACTITIONER

## 2020-07-20 NOTE — PROGRESS NOTES
[facility] Cardiothoracic Surgery Follow-Up Note    Name:  Rakan Boone  MRN Number:  [id number]  Date of Encounter:  07/20/2020    Referred By:  No ref. provider found  PCP:  Kenneth Britton MD    Chief Complaint:    Chief Complaint   Patient presents with   • Post-op Follow-up     Hospital follow-up s/p MVR 6/18/20-mitral valve insufficiency. Patient states he is dizzy at times.        History of Present Illness:    Mr. Rakan Boone is a pleasant 59 y.o. male with a history of hypertension, hyperlipidemia, and mitral valve insufficiency s/p mitral valve replacement and left atrial appendage ligation 6/18/2020 per Dr. Burden who returns the office today for postoperative exam.  The patient denies incisional pain, worsening shortness of breath or difficulty with ambulation.  The patient is scheduled to follow-up with his cardiologist, Dr. Goldstein, in August.  The patient's blood pressure was noted to be within normal limits in the office today.  The patient states that he does have some intermittent atrial fibrillation, but thinks this has gotten better since his surgery    Review of Systems:  Review of Systems   Constitution: Negative for chills, fever and malaise/fatigue.   Eyes: Negative for visual disturbance.   Cardiovascular: Negative for chest pain, dyspnea on exertion and leg swelling.   Respiratory: Negative for hemoptysis and shortness of breath.    Skin: Negative for poor wound healing.   Gastrointestinal: Negative for abdominal pain.   Neurological: Negative for weakness.   Psychiatric/Behavioral: Negative for altered mental status.       Past Medical History:    Past Medical History:   Diagnosis Date   • Anxiety    • Depression    • HLD (hyperlipidemia)    • Hypertension        Past Surgical History:    Past Surgical History:   Procedure Laterality Date   • ANKLE SURGERY     • ARM AMPUTATION     • CARDIAC CATHETERIZATION N/A 6/16/2020    Procedure: Left Heart Cath 18882 C19 06/12 @   [facility];  Surgeon: Refugio Goldstein MD;  Location:  [facility];  Service: Cardiovascular;  Laterality: N/A;   • CORONARY ANGIOPLASTY WITH STENT PLACEMENT     • MITRAL VALVE REPAIR/REPLACEMENT N/A 6/18/2020    Procedure: MITRAL VALVEREPLACEMENT, MITRAL CLIP, TRANSESOPHAGEAL ECHOCARDIOGRAM;  Surgeon: Cooper Burden MD;  Location:  [facility] OR;  Service: Cardiothoracic;  Laterality: N/A;   • ROTATOR CUFF REPAIR         Patient Active Problem List   Diagnosis   • Severe mitral valve regurgitation (S/P MVR)   • Hypertension   • Dyslipidemia   • BPH    • Anxiety and depression   • Chewing tobacco use     Social History     Tobacco Use   • Smoking status: Never Smoker   • Smokeless tobacco: Current User     Types: Chew   Substance Use Topics   • Alcohol use: Never     Frequency: Never   • Drug use: Never     Family History   Problem Relation Age of Onset   • Cancer Mother    • Hyperlipidemia Mother    • Heart failure Father    • Coronary artery disease Father        Medications:      Current Outpatient Medications:   •  amLODIPine (NORVASC) 5 MG tablet, Take 5 mg by mouth Daily., Disp: , Rfl:   •  aspirin  MG EC tablet, Take 1 tablet by mouth Daily., Disp: 30 tablet, Rfl: 6  •  atorvastatin (LIPITOR) 40 MG tablet, Take 40 mg by mouth Daily., Disp: , Rfl:   •  metoprolol succinate XL (TOPROL-XL) 100 MG 24 hr tablet, Take 100 mg by mouth Daily., Disp: , Rfl:   •  sertraline (ZOLOFT) 100 MG tablet, Take 100 mg by mouth Daily., Disp: , Rfl:   •  tamsulosin (FLOMAX) 0.4 MG capsule 24 hr capsule, Take 1 capsule by mouth Daily., Disp: , Rfl:   •  furosemide (LASIX) 20 MG tablet, Take 20 mg by mouth Daily., Disp: , Rfl:   •  HYDROcodone-acetaminophen (NORCO) 7.5-325 MG per tablet, Take 1 tablet by mouth Every 6 (Six) Hours As Needed for Moderate Pain ., Disp: 24 tablet, Rfl: 0  •  isosorbide mononitrate (IMDUR) 30 MG 24 hr tablet, Take 30 mg by mouth Daily., Disp: , Rfl:   •  potassium chloride  "(K-DUR,KLOR-CON) 20 MEQ CR tablet, Take 20 mEq by mouth 2 (Two) Times a Day., Disp: , Rfl:     Allergies:  No Known Allergies    Physical Exam:  Vital Signs:    Vitals:    07/20/20 1035   BP: 119/65   BP Location: Right arm   Patient Position: Sitting   Pulse: 74   Temp: 98 °F (36.7 °C)   SpO2: 99%   Weight: 85.3 kg (188 lb)   Height: 170.2 cm (67\")       Physical Exam   Gen- NAD, pleasant, cooperative  CV- Regular rate and rhythm, no murmur, gallop or rub  Pulm- Clear to auscultation bilateral without wheeze or rhonchi  GI- Soft, normoactive bowel sounds, non-tender  Ext- Without edema,   Incision- Well approximated midsternal MT sites with no erythema, drainage or dehiscence noted.  Neuro- CN II- XII grossly intact, tongue midline, voice normal.      Labs/Imaging:  Chest x-ray, [facility], 7/20/2020  Impression:  No acute cardiopulmonary disease.  I personally reviewed these images in the office today.    Assessment / Plan:  Mr. Rakan Boone is a pleasant 59 y.o. male with a history of hypertension, hyperlipidemia, and mitral valve insufficiency s/p mitral valve replacement and left atrial appendage ligation 6/18/2020 per Dr. Burden who returns the office today for postoperative exam.  The patient denies incisional pain, worsening shortness of breath or difficulty with ambulation.  The patient is scheduled to follow-up with his cardiologist, Dr. Goldstein, in August.  The patient's blood pressure was noted to be within normal limits in the office today.  The patient states that he does have some intermittent atrial fibrillation, but thinks this has gotten better since his surgery.  The patient was instructed that he may begin driving at 6 weeks postop as long as he is not experiencing any dizziness or using pain medication.  I have outlined the physical activity suitable for each benchmark between 6 weeks, 3 months in 1 year.  At this time, the patient will resume medical management with his " cardiologist, Dr. Goldstein.  Should he have any questions or concerns in the interval, he may contact the office.    Patient Education:  Post-Op Education:  I reviewed the patient's sternal precautions and outlined the physical activity suitable for each benchmark at the 6-week 3-month and 1 year intervals.  Wound Care:  Patient educated regarding care of post-operative wounds.  Patient is to wash with plain soap and water.  Patient is not to use salves on the incision sites.  Patient instructed regarding the signs and symptoms of infection and when to call the office.    Please note, this document was produced using voice recognition software.    VANDANA Acuna  [facility] Cardiothoracic Surgery

## 2021-12-02 ENCOUNTER — HOSPITAL ENCOUNTER (OUTPATIENT)
Dept: INFUSION THERAPY | Facility: HOSPITAL | Age: 61
Discharge: HOME | End: 2021-12-02
Admitting: NURSE PRACTITIONER

## 2021-12-02 VITALS
RESPIRATION RATE: 18 BRPM | SYSTOLIC BLOOD PRESSURE: 123 MMHG | HEART RATE: 84 BPM | DIASTOLIC BLOOD PRESSURE: 70 MMHG | OXYGEN SATURATION: 98 % | TEMPERATURE: 98.9 F

## 2021-12-02 DIAGNOSIS — U07.1 CLINICAL DIAGNOSIS OF SEVERE ACUTE RESPIRATORY SYNDROME CORONAVIRUS 2 (SARS-COV-2) DISEASE: Primary | ICD-10-CM

## 2021-12-02 PROCEDURE — M0243 CASIRIVI AND IMDEVI INFUSION: HCPCS | Performed by: NURSE PRACTITIONER

## 2021-12-02 PROCEDURE — 25010000002 INJECTION, CASIRIVIMAB AND IMDEVIMAB, 1200 MG: Performed by: NURSE PRACTITIONER

## 2021-12-02 RX ORDER — METHYLPREDNISOLONE SODIUM SUCCINATE 125 MG/2ML
125 INJECTION, POWDER, LYOPHILIZED, FOR SOLUTION INTRAMUSCULAR; INTRAVENOUS AS NEEDED
Status: CANCELLED | OUTPATIENT
Start: 2021-12-02

## 2021-12-02 RX ORDER — DIPHENHYDRAMINE HYDROCHLORIDE 50 MG/ML
50 INJECTION INTRAMUSCULAR; INTRAVENOUS ONCE AS NEEDED
Status: DISCONTINUED | OUTPATIENT
Start: 2021-12-02 | End: 2021-12-04 | Stop reason: HOSPADM

## 2021-12-02 RX ORDER — DIPHENHYDRAMINE HYDROCHLORIDE 50 MG/ML
50 INJECTION INTRAMUSCULAR; INTRAVENOUS ONCE AS NEEDED
Status: CANCELLED | OUTPATIENT
Start: 2021-12-02

## 2021-12-02 RX ORDER — DIPHENHYDRAMINE HCL 50 MG
50 CAPSULE ORAL ONCE AS NEEDED
Status: CANCELLED | OUTPATIENT
Start: 2021-12-02

## 2021-12-02 RX ORDER — DIPHENHYDRAMINE HCL 50 MG
50 CAPSULE ORAL ONCE AS NEEDED
Status: DISCONTINUED | OUTPATIENT
Start: 2021-12-02 | End: 2021-12-04 | Stop reason: HOSPADM

## 2021-12-02 RX ORDER — EPINEPHRINE 1 MG/ML
0.3 INJECTION, SOLUTION INTRAMUSCULAR; SUBCUTANEOUS AS NEEDED
Status: CANCELLED | OUTPATIENT
Start: 2021-12-02

## 2021-12-02 RX ORDER — METHYLPREDNISOLONE SODIUM SUCCINATE 125 MG/2ML
125 INJECTION, POWDER, LYOPHILIZED, FOR SOLUTION INTRAMUSCULAR; INTRAVENOUS AS NEEDED
Status: DISCONTINUED | OUTPATIENT
Start: 2021-12-02 | End: 2021-12-04 | Stop reason: HOSPADM

## 2021-12-02 RX ORDER — EPINEPHRINE 1 MG/ML
0.3 INJECTION, SOLUTION INTRAMUSCULAR; SUBCUTANEOUS AS NEEDED
Status: DISCONTINUED | OUTPATIENT
Start: 2021-12-02 | End: 2021-12-04 | Stop reason: HOSPADM

## 2021-12-02 RX ADMIN — CASIRIVIMAB AND IMDEVIMAB: 600; 600 INJECTION, SOLUTION, CONCENTRATE INTRAVENOUS at 11:00

## (undated) DEVICE — TOWEL,OR,DSP,ST,BLUE,STD,8/PK,10PK/CS: Brand: MEDLINE

## (undated) DEVICE — PRESSURE MONITORING SET: Brand: TRUWAVE, VAMP

## (undated) DEVICE — Device

## (undated) DEVICE — DEV COMP RAD PRELUDESYNC 24CM

## (undated) DEVICE — CONNECTOR PH 6-IN-1 Y ST: Brand: CARDINAL HEALTH

## (undated) DEVICE — CATH DIAG EXPO M/ PK 5F FL4/FR4 PIG

## (undated) DEVICE — SOL NS 500ML

## (undated) DEVICE — SUT PROLN 3/0 8832H

## (undated) DEVICE — SUT SILK 0/0 CT2 18IN C027D

## (undated) DEVICE — CVR HNDL LIGHT RIGID

## (undated) DEVICE — SPNG GZ WOVN 4X4IN 12PLY 10/BX STRL

## (undated) DEVICE — MODEL BT2000 P/N 700287-012KIT CONTENTS: MANIFOLD WITH SALINE AND CONTRAST PORTS, SALINE TUBING WITH SPIKE AND HAND SYRINGE, TRANSDUCER: Brand: BT2000 AUTOMATED MANIFOLD KIT

## (undated) DEVICE — PK SPECIALTYCARE M/STATE 1 OH 1/2V CUST

## (undated) DEVICE — 2963 MEDIPORE SOFT CLOTH TAPE 3 IN X 10 YD 12 RLS/CS: Brand: 3M™ MEDIPORE™

## (undated) DEVICE — SUT SILK 2 SUTUPAK TIE 60IN SA8H 2STRAND

## (undated) DEVICE — SUT PROLN 4/0 SH D/A 36IN 8521H

## (undated) DEVICE — SENSR CERBRL O2 SOMASENSOR ADHS A/ LF

## (undated) DEVICE — PRESSURE MONITORING ACCESSORY: Brand: TRUWAVE

## (undated) DEVICE — GW INQWIRE FC PTFE STD J/1.5 .035 260

## (undated) DEVICE — SUT PROLN 4/0 RB1 D/A 36IN 8557H

## (undated) DEVICE — 12 FOOT DISPOSABLE EXTENSION CABLE WITH SAFE CONNECT / SCREW-DOWN

## (undated) DEVICE — Device: Brand: MEDEX

## (undated) DEVICE — CATH DIAG EXPO .045 FL3  5F 100CM

## (undated) DEVICE — PK CATH CARD 10

## (undated) DEVICE — HEMOCONCENTRATOR CAPIOX PED SM W/TB

## (undated) DEVICE — TRAP,MUCUS SPECIMEN,40CC: Brand: MEDLINE

## (undated) DEVICE — MODEL AT P65, P/N 701554-001KIT CONTENTS: HAND CONTROLLER, 3-WAY HIGH-PRESSURE STOPCOCK WITH ROTATING END AND PREMIUM HIGH-PRESSURE TUBING: Brand: ANGIOTOUCH® KIT

## (undated) DEVICE — OASIS DRAIN, SINGLE, INLINE & ATS COMPATIBLE: Brand: OASIS

## (undated) DEVICE — CLTH CLENS READYCLEANSE PERI CARE PK/5

## (undated) DEVICE — TP UMB COTN 30IN U11T

## (undated) DEVICE — SKIN PREP TRAY W/CHG: Brand: MEDLINE INDUSTRIES, INC.

## (undated) DEVICE — MEDI-VAC NON-CONDUCTIVE SUCTION TUBING: Brand: CARDINAL HEALTH

## (undated) DEVICE — SUCTION CANISTER, 2500CC, RIGID: Brand: DEROYAL

## (undated) DEVICE — INTRO SHEATH PRELUDE IDEAL SPRNG COIL 021 6F 23X80CM

## (undated) DEVICE — SUT PDS 1 CTX 36IN VIO PDP371T

## (undated) DEVICE — BLD SCLPL BEAVR MINI STR 2BVL 180D LF

## (undated) DEVICE — ORGANIZER SUT GABBAY FRATER GFS10A PK/3

## (undated) DEVICE — TUBING, SUCTION, 1/4" X 10', STRAIGHT: Brand: MEDLINE

## (undated) DEVICE — PK HEART OPN 10

## (undated) DEVICE — AVANTI + 4F STD W/GW: Brand: AVANTI

## (undated) DEVICE — ANTIBACTERIAL UNDYED BRAIDED (POLYGLACTIN 910), SYNTHETIC ABSORBABLE SUTURE: Brand: COATED VICRYL

## (undated) DEVICE — INTRAOPERATIVE COVER KIT, 10 PACK: Brand: SITE-RITE

## (undated) DEVICE — SAFETY SCALPEL: Brand: DEROYAL

## (undated) DEVICE — PAD ARMBRD SURG CONVOL 7.5X20X2IN

## (undated) DEVICE — NDL PERC 1PRT THNWALL W/BASEPLT 18G 7CM

## (undated) DEVICE — 3M™ TEGADERM™ CHG DRESSING 25/CARTON 4 CARTONS/CASE 1658: Brand: TEGADERM™